# Patient Record
Sex: FEMALE | Race: OTHER | Employment: UNEMPLOYED | URBAN - METROPOLITAN AREA
[De-identification: names, ages, dates, MRNs, and addresses within clinical notes are randomized per-mention and may not be internally consistent; named-entity substitution may affect disease eponyms.]

---

## 2017-11-27 ENCOUNTER — OFFICE VISIT (OUTPATIENT)
Dept: FAMILY MEDICINE CLINIC | Age: 77
End: 2017-11-27

## 2017-11-27 VITALS
WEIGHT: 117.6 LBS | DIASTOLIC BLOOD PRESSURE: 64 MMHG | BODY MASS INDEX: 23.71 KG/M2 | HEART RATE: 70 BPM | RESPIRATION RATE: 12 BRPM | HEIGHT: 59 IN | TEMPERATURE: 98.4 F | OXYGEN SATURATION: 96 % | SYSTOLIC BLOOD PRESSURE: 98 MMHG

## 2017-11-27 DIAGNOSIS — R63.4 WEIGHT LOSS: ICD-10-CM

## 2017-11-27 DIAGNOSIS — I10 ESSENTIAL HYPERTENSION: Primary | ICD-10-CM

## 2017-11-27 DIAGNOSIS — R41.3 MEMORY PROBLEM: ICD-10-CM

## 2017-11-27 DIAGNOSIS — H91.93 BILATERAL HEARING LOSS, UNSPECIFIED HEARING LOSS TYPE: ICD-10-CM

## 2017-11-27 DIAGNOSIS — E78.5 HYPERLIPIDEMIA, UNSPECIFIED HYPERLIPIDEMIA TYPE: ICD-10-CM

## 2017-11-27 DIAGNOSIS — Z86.73 HISTORY OF STROKE: ICD-10-CM

## 2017-11-27 DIAGNOSIS — H54.7 VISION DECREASED: ICD-10-CM

## 2017-11-27 DIAGNOSIS — Z23 NEED FOR INFLUENZA VACCINATION: ICD-10-CM

## 2017-11-27 PROCEDURE — 1123F ACP DISCUSS/DSCN MKR DOCD: CPT | Performed by: FAMILY MEDICINE

## 2017-11-27 PROCEDURE — 99204 OFFICE O/P NEW MOD 45 MIN: CPT | Performed by: FAMILY MEDICINE

## 2017-11-27 PROCEDURE — G8484 FLU IMMUNIZE NO ADMIN: HCPCS | Performed by: FAMILY MEDICINE

## 2017-11-27 PROCEDURE — 4040F PNEUMOC VAC/ADMIN/RCVD: CPT | Performed by: FAMILY MEDICINE

## 2017-11-27 PROCEDURE — 1036F TOBACCO NON-USER: CPT | Performed by: FAMILY MEDICINE

## 2017-11-27 PROCEDURE — 1090F PRES/ABSN URINE INCON ASSESS: CPT | Performed by: FAMILY MEDICINE

## 2017-11-27 PROCEDURE — 90686 IIV4 VACC NO PRSV 0.5 ML IM: CPT | Performed by: FAMILY MEDICINE

## 2017-11-27 PROCEDURE — 90471 IMMUNIZATION ADMIN: CPT | Performed by: FAMILY MEDICINE

## 2017-11-27 PROCEDURE — G8420 CALC BMI NORM PARAMETERS: HCPCS | Performed by: FAMILY MEDICINE

## 2017-11-27 PROCEDURE — G8400 PT W/DXA NO RESULTS DOC: HCPCS | Performed by: FAMILY MEDICINE

## 2017-11-27 PROCEDURE — G8427 DOCREV CUR MEDS BY ELIG CLIN: HCPCS | Performed by: FAMILY MEDICINE

## 2017-11-27 RX ORDER — ATORVASTATIN CALCIUM 40 MG/1
TABLET, FILM COATED ORAL
Refills: 1 | COMMUNITY
Start: 2017-10-03 | End: 2017-11-27 | Stop reason: SDUPTHER

## 2017-11-27 RX ORDER — METOPROLOL SUCCINATE 50 MG/1
50 TABLET, EXTENDED RELEASE ORAL DAILY
Qty: 90 TABLET | Refills: 1 | Status: SHIPPED | OUTPATIENT
Start: 2017-11-27 | End: 2018-04-30 | Stop reason: SDUPTHER

## 2017-11-27 RX ORDER — ASPIRIN 325 MG
325 TABLET ORAL DAILY
COMMUNITY
End: 2017-11-27 | Stop reason: SDUPTHER

## 2017-11-27 RX ORDER — ATORVASTATIN CALCIUM 40 MG/1
40 TABLET, FILM COATED ORAL DAILY
Qty: 90 TABLET | Refills: 1 | Status: SHIPPED | OUTPATIENT
Start: 2017-11-27 | End: 2018-04-30 | Stop reason: SDUPTHER

## 2017-11-27 RX ORDER — METOPROLOL SUCCINATE 50 MG/1
TABLET, EXTENDED RELEASE ORAL
Refills: 1 | COMMUNITY
Start: 2017-10-03 | End: 2017-11-27 | Stop reason: SDUPTHER

## 2017-11-27 RX ORDER — ASPIRIN 325 MG
325 TABLET ORAL DAILY
Qty: 90 TABLET | Refills: 1 | Status: SHIPPED | OUTPATIENT
Start: 2017-11-27 | End: 2018-04-30 | Stop reason: SDUPTHER

## 2017-11-27 NOTE — PROGRESS NOTES
Cliff Muñoz   YOB: 1940    Date of Visit:  11/27/2017        Chief Complaint   Patient presents with    Established New Doctor     New patient visit    Weight Loss     Daughter is concerned with mothers weight loss in the past 6-8 months. Pts daughter states pt has loss a lot of weight     Hypertension    Hyperlipidemia    Other     s/p CVA    Memory Loss    Hearing Loss    Cataract     Patient is a 75-year-old Jayden only speaking female presenting to [facility] primary care. She is accompanied here today by her daughter, Rema Mohs. HPI:      Hypertension:  Condition is chronic. Home blood pressure monitoring has been within normal range. Patient denies chest pain, shortness of breath, headache, lightheadedness, peripheral edema and palpitations. No medication side effects noted from antihypertensive medication. Hyperlipidemia:  No new myalgias or GI upset on atorvastatin (Lipitor). Does not exercise regularly. Does try to adhere to a low cholesterol diet. H/O Stroke: She presents for follow-up of a stroke that occurred in 2013. She has no residual symptoms. She denies gait disturbance, balance disturbance, slurred speech, weakness/paralysis. Overall she feels the condition is resolved and stable. Stroke risk factors include hyperlipidemia and hypertension. She denies chest pain, palpitations, seizures and shortness of breath. She takes a full-dose aspirin daily. Weight loss:  Patient's daughter requesting discussion regarding unexplained/excessive weight loss. Onset was a few weeks ago. Patient nor her daughter is able to elaborate on approximately how much weight patient has lost, however, daughter feels that she is looking thinner. Patient feels her ideal weight is current weight. She denies history of eating disorder.  Patient denies abdominal pain, binge/ purge behaviors, constipation, depressive symptoms, diarrhea, intentional calorie content of this document, please contact the author as some errors in transcription may have occurred.

## 2017-11-27 NOTE — PROGRESS NOTES
Vaccine Information Sheet, \"Influenza - Inactivated\"  given to Biju Soto, or parent/legal guardian of  Biju Soto and verbalized understanding. Patient responses:    Have you ever had a reaction to a flu vaccine? No  Are you able to eat eggs without adverse effects? Yes  Do you have any current illness? No  Have you ever had Guillian Beaufort Syndrome? No    Flu vaccine given per order. Please see immunization tab.

## 2017-11-27 NOTE — PATIENT INSTRUCTIONS
in your health, and be sure to contact your doctor if:  · You do not get better as expected. · You continue to lose weight. Where can you learn more? Go to https://FREECULTRpejuanewCemmerce.Buzzvil. org and sign in to your My Healthy World account. Enter K290 in the EchoSign box to learn more about \"Abnormal Weight Loss: Care Instructions. \"     If you do not have an account, please click on the \"Sign Up Now\" link. Current as of: October 13, 2016  Content Version: 11.3  © 6182-1438 [employer]. Care instructions adapted under license by [facility] ([facility]). If you have questions about a medical condition or this instruction, always ask your healthcare professional. [address] any warranty or liability for your use of this information. Patient Education        Body Mass Index: Care Instructions  Your Care Instructions    Body mass index (BMI) can help you see if your weight is raising your risk for health problems. It uses a formula to compare how much you weigh with how tall you are. · A BMI lower than 18.5 is considered underweight. · A BMI between 18.5 and 24.9 is considered healthy. · A BMI between 25 and 29.9 is considered overweight. A BMI of 30 or higher is considered obese. If your BMI is in the normal range, it means that you have a lower risk for weight-related health problems. If your BMI is in the overweight or obese range, you may be at increased risk for weight-related health problems, such as high blood pressure, heart disease, stroke, arthritis or joint pain, and diabetes. If your BMI is in the underweight range, you may be at increased risk for health problems such as fatigue, lower protection (immunity) against illness, muscle loss, bone loss, hair loss, and hormone problems. BMI is just one measure of your risk for weight-related health problems.  You may be at higher risk for health problems if you are not active, you eat an unhealthy diet, or you drink sign in to your [employer] account. Enter S176 in the Needish box to learn more about \"Body Mass Index: Care Instructions. \"     If you do not have an account, please click on the \"Sign Up Now\" link. Current as of: January 23, 2017  Content Version: 11.3  © 6974-8065 [employer]. Care instructions adapted under license by [facility] [employer] [employer] [facility] ([facility]). If you have questions about a medical condition or this instruction, always ask your healthcare professional. [address] any warranty or liability for your use of this information. Patient Education        Helping A Person With Dementia: Care Instructions  Your Care Instructions  Dementia is a loss of mental skills that affects daily life. It is different from mild memory loss that occurs with aging. Dementia can cause problems with memory, thinking clearly, and planning. It is different for everyone. But it usually gets worse slowly. Some people who have dementia can function well for a long time. But at some point it may become hard for the person to care for himself or herself. It can be upsetting to learn that a loved one has this condition. You may be afraid and worried about what will happen. You may wonder how you will care for the person. There is no cure for dementia. But medicine may be able to slow memory loss and improve thinking for a while. Other medicines may help with sleep, depression, and behavior changes. Dementia is different for everyone. In some cases, people can function well for a long time. You can help your loved one by making his or her home life easier and safer. You also need to take care of yourself. Caregiving can be stressful. But support is available to help you and give you a break when you need it. The Alzheimer's Association offers good information and support. If you are caring for someone with dementia, you can help make life safer and more comfortable.  You can also help your loved one make decisions about future care. You may also want to bring up legal and financial issues. These are hard but important conversations to have. Follow-up care is a key part of your loved one's treatment and safety. Be sure to make and go to all appointments, and call your doctor if your loved one is having problems. It's also a good idea to know your loved one's test results and keep a list of the medicines he or she takes. How can you care for your loved one at home? Taking care of the person  · If the person takes medicine for dementia, help him or her take it exactly as prescribed. Call the doctor if you notice any problems with the medicine. · Make a list of the person's medicines. Review it with all of his or her doctors. · Help the person eat a balanced diet. Serve plenty of whole grains, fruits, and vegetables every day. If the person is not hungry at mealtimes, give snacks at midmorning and in the afternoon. Offer drinks such as Boost, Ensure, or Sustacal if the person is losing weight. · Encourage exercise. Walking and other activities may slow the decline of mental ability. Help the person stay active mentally with reading, crossword puzzles, or other hobbies. · Take steps to help if the person is sundowning. This is the restless behavior and trouble with sleeping that may occur in late afternoon and at night. Try not to let the person nap during the day. Offer a glass of warm milk or caffeine-free tea before bedtime. · Develop a routine. Your loved one will feel less frustrated or confused with a clear, simple plan of what to do every day. · Be patient. A task may take the person longer than it used to. · For as long as he or she is able, allow your loved one to make decisions about activities, food, clothing, and other choices. Let him or her be independent, even if tasks take more time or are not done perfectly. Tailor tasks to the person's abilities.  For example, if cooking is no longer safe, problems. It's also a good idea to know your test results and keep a list of the medicines you take. Where can you learn more? Go to https://chpejuaneweb.The African Store. org and sign in to your [employer] Autom[employer]et account. Enter [id number] [phone] in the [facility] box to learn more about \"Learning About the Symptoms of Dementia. \"     If you do not have an account, please click on the \"Sign Up Now\" link. Current as of: May 3, 2017  Content Version: 11.3  © 7168-2377 [employer]. Care instructions adapted under license by [facility] ([facility]). If you have questions about a medical condition or this instruction, always ask your healthcare professional. [address] any warranty or liability for your use of this information.

## 2017-11-29 ENCOUNTER — TELEPHONE (OUTPATIENT)
Dept: FAMILY MEDICINE CLINIC | Age: 77
End: 2017-11-29

## 2017-11-29 DIAGNOSIS — E78.5 HYPERLIPIDEMIA, UNSPECIFIED HYPERLIPIDEMIA TYPE: ICD-10-CM

## 2017-11-29 DIAGNOSIS — R63.4 WEIGHT LOSS: ICD-10-CM

## 2017-11-29 DIAGNOSIS — E55.9 VITAMIN D DEFICIENCY: Primary | ICD-10-CM

## 2017-11-29 LAB
A/G RATIO: 1.1 (ref 1.1–2.2)
ALBUMIN SERPL-MCNC: 3.7 G/DL (ref 3.4–5)
ALP BLD-CCNC: 82 U/L (ref 40–129)
ALT SERPL-CCNC: 9 U/L (ref 10–40)
ANION GAP SERPL CALCULATED.3IONS-SCNC: 16 MMOL/L (ref 3–16)
AST SERPL-CCNC: 21 U/L (ref 15–37)
BASOPHILS ABSOLUTE: 0.1 K/UL (ref 0–0.2)
BASOPHILS RELATIVE PERCENT: 1.2 %
BILIRUB SERPL-MCNC: 0.5 MG/DL (ref 0–1)
BUN BLDV-MCNC: 20 MG/DL (ref 7–20)
CALCIUM SERPL-MCNC: 8.6 MG/DL (ref 8.3–10.6)
CHLORIDE BLD-SCNC: 103 MMOL/L (ref 99–110)
CO2: 22 MMOL/L (ref 21–32)
CREAT SERPL-MCNC: 0.8 MG/DL (ref 0.6–1.2)
EOSINOPHILS ABSOLUTE: 0.3 K/UL (ref 0–0.6)
EOSINOPHILS RELATIVE PERCENT: 4.6 %
FOLATE: 12.82 NG/ML (ref 4.78–24.2)
GFR AFRICAN AMERICAN: >60
GFR NON-AFRICAN AMERICAN: >60
GLOBULIN: 3.3 G/DL
GLUCOSE BLD-MCNC: 81 MG/DL (ref 70–99)
HCT VFR BLD CALC: 35 % (ref 36–48)
HEMOGLOBIN: 11.5 G/DL (ref 12–16)
LYMPHOCYTES ABSOLUTE: 2.1 K/UL (ref 1–5.1)
LYMPHOCYTES RELATIVE PERCENT: 34.5 %
MCH RBC QN AUTO: 29.5 PG (ref 26–34)
MCHC RBC AUTO-ENTMCNC: 33 G/DL (ref 31–36)
MCV RBC AUTO: 89.5 FL (ref 80–100)
MONOCYTES ABSOLUTE: 0.7 K/UL (ref 0–1.3)
MONOCYTES RELATIVE PERCENT: 11.4 %
NEUTROPHILS ABSOLUTE: 3 K/UL (ref 1.7–7.7)
NEUTROPHILS RELATIVE PERCENT: 48.3 %
PDW BLD-RTO: 13.9 % (ref 12.4–15.4)
PLATELET # BLD: 257 K/UL (ref 135–450)
PMV BLD AUTO: 8.8 FL (ref 5–10.5)
POTASSIUM SERPL-SCNC: 4.8 MMOL/L (ref 3.5–5.1)
RBC # BLD: 3.91 M/UL (ref 4–5.2)
SODIUM BLD-SCNC: 141 MMOL/L (ref 136–145)
TOTAL PROTEIN: 7 G/DL (ref 6.4–8.2)
TSH REFLEX FT4: 3.66 UIU/ML (ref 0.27–4.2)
VITAMIN B-12: 313 PG/ML (ref 211–911)
WBC # BLD: 6.2 K/UL (ref 4–11)

## 2017-11-29 NOTE — TELEPHONE ENCOUNTER
Patient stopped in for labs, and her son wanted to see if she could have lipid panel done since she is taking cholesterol medication and the Vit D test, due to patient was taking vitamin D as well, please advise patient if yes or no, and if so please have this added to labs to be completed

## 2017-11-30 LAB
CHOLESTEROL, TOTAL: 141 MG/DL (ref 0–199)
HDLC SERPL-MCNC: 55 MG/DL (ref 40–60)
LDL CHOLESTEROL CALCULATED: 70 MG/DL
RPR: NORMAL
TRIGL SERPL-MCNC: 82 MG/DL (ref 0–150)
VITAMIN D 25-HYDROXY: 11.5 NG/ML
VLDLC SERPL CALC-MCNC: 16 MG/DL

## 2017-12-01 ENCOUNTER — TELEPHONE (OUTPATIENT)
Dept: FAMILY MEDICINE CLINIC | Age: 77
End: 2017-12-01

## 2017-12-01 NOTE — TELEPHONE ENCOUNTER
Daughter calling for recent lab results. Advised her Dr. Ama Sampson has not reviewed them yet. Will receive a call after.     Daughter Celina Baker #[phone]

## 2017-12-02 PROBLEM — Z86.73 HISTORY OF STROKE: Status: ACTIVE | Noted: 2017-12-02

## 2017-12-02 PROBLEM — Z86.69 HISTORY OF CATARACT: Status: ACTIVE | Noted: 2017-12-02

## 2017-12-02 PROBLEM — R41.3 MEMORY PROBLEM: Status: ACTIVE | Noted: 2017-12-02

## 2017-12-02 ASSESSMENT — ENCOUNTER SYMPTOMS
BLURRED VISION: 1
GASTROINTESTINAL NEGATIVE: 1
RESPIRATORY NEGATIVE: 1

## 2017-12-05 DIAGNOSIS — E55.9 VITAMIN D DEFICIENCY: Primary | ICD-10-CM

## 2017-12-05 RX ORDER — ERGOCALCIFEROL 1.25 MG/1
50000 CAPSULE ORAL WEEKLY
Qty: 12 CAPSULE | Refills: 0 | Status: SHIPPED | OUTPATIENT
Start: 2017-12-05 | End: 2018-03-20 | Stop reason: SDUPTHER

## 2017-12-12 ENCOUNTER — OFFICE VISIT (OUTPATIENT)
Dept: ENT CLINIC | Age: 77
End: 2017-12-12

## 2017-12-12 DIAGNOSIS — H90.3 SENSORY HEARING LOSS, BILATERAL: Primary | ICD-10-CM

## 2017-12-12 DIAGNOSIS — H91.13 PRESBYCUSIS OF BOTH EARS: ICD-10-CM

## 2017-12-12 PROCEDURE — 92557 COMPREHENSIVE HEARING TEST: CPT | Performed by: AUDIOLOGIST

## 2017-12-12 PROCEDURE — 92550 TYMPANOMETRY & REFLEX THRESH: CPT | Performed by: AUDIOLOGIST

## 2018-03-20 RX ORDER — ERGOCALCIFEROL 1.25 MG/1
50000 CAPSULE ORAL WEEKLY
Qty: 12 CAPSULE | Refills: 0 | Status: SHIPPED | OUTPATIENT
Start: 2018-03-20 | End: 2018-04-30 | Stop reason: SDUPTHER

## 2018-04-30 ENCOUNTER — OFFICE VISIT (OUTPATIENT)
Dept: FAMILY MEDICINE CLINIC | Age: 78
End: 2018-04-30

## 2018-04-30 VITALS
SYSTOLIC BLOOD PRESSURE: 102 MMHG | BODY MASS INDEX: 23.39 KG/M2 | DIASTOLIC BLOOD PRESSURE: 60 MMHG | WEIGHT: 116 LBS | OXYGEN SATURATION: 98 % | TEMPERATURE: 97.2 F | HEART RATE: 102 BPM | RESPIRATION RATE: 16 BRPM | HEIGHT: 59 IN

## 2018-04-30 DIAGNOSIS — Z86.73 HISTORY OF STROKE: ICD-10-CM

## 2018-04-30 DIAGNOSIS — I10 ESSENTIAL HYPERTENSION: Primary | ICD-10-CM

## 2018-04-30 DIAGNOSIS — Z23 NEED FOR PNEUMOCOCCAL VACCINATION: ICD-10-CM

## 2018-04-30 DIAGNOSIS — Z23 NEED FOR SHINGLES VACCINE: ICD-10-CM

## 2018-04-30 DIAGNOSIS — M85.80 AGE-RELATED BONE LOSS: ICD-10-CM

## 2018-04-30 DIAGNOSIS — E55.9 VITAMIN D DEFICIENCY: ICD-10-CM

## 2018-04-30 DIAGNOSIS — E78.5 HYPERLIPIDEMIA, UNSPECIFIED HYPERLIPIDEMIA TYPE: ICD-10-CM

## 2018-04-30 DIAGNOSIS — D64.9 ANEMIA, UNSPECIFIED TYPE: ICD-10-CM

## 2018-04-30 LAB
CHOLESTEROL, TOTAL: 123 MG/DL (ref 0–199)
HDLC SERPL-MCNC: 71 MG/DL (ref 40–60)
LDL CHOLESTEROL CALCULATED: 41 MG/DL
TRIGL SERPL-MCNC: 57 MG/DL (ref 0–150)
VITAMIN D 25-HYDROXY: 51.5 NG/ML
VLDLC SERPL CALC-MCNC: 11 MG/DL

## 2018-04-30 PROCEDURE — G8427 DOCREV CUR MEDS BY ELIG CLIN: HCPCS | Performed by: FAMILY MEDICINE

## 2018-04-30 PROCEDURE — 1090F PRES/ABSN URINE INCON ASSESS: CPT | Performed by: FAMILY MEDICINE

## 2018-04-30 PROCEDURE — G8400 PT W/DXA NO RESULTS DOC: HCPCS | Performed by: FAMILY MEDICINE

## 2018-04-30 PROCEDURE — G8420 CALC BMI NORM PARAMETERS: HCPCS | Performed by: FAMILY MEDICINE

## 2018-04-30 PROCEDURE — 90471 IMMUNIZATION ADMIN: CPT | Performed by: FAMILY MEDICINE

## 2018-04-30 PROCEDURE — 1036F TOBACCO NON-USER: CPT | Performed by: FAMILY MEDICINE

## 2018-04-30 PROCEDURE — 1123F ACP DISCUSS/DSCN MKR DOCD: CPT | Performed by: FAMILY MEDICINE

## 2018-04-30 PROCEDURE — 90670 PCV13 VACCINE IM: CPT | Performed by: FAMILY MEDICINE

## 2018-04-30 PROCEDURE — 99214 OFFICE O/P EST MOD 30 MIN: CPT | Performed by: FAMILY MEDICINE

## 2018-04-30 PROCEDURE — 4040F PNEUMOC VAC/ADMIN/RCVD: CPT | Performed by: FAMILY MEDICINE

## 2018-04-30 RX ORDER — METOPROLOL SUCCINATE 50 MG/1
50 TABLET, EXTENDED RELEASE ORAL DAILY
Qty: 90 TABLET | Refills: 2 | Status: SHIPPED | OUTPATIENT
Start: 2018-04-30 | End: 2019-03-14 | Stop reason: SDUPTHER

## 2018-04-30 RX ORDER — ASPIRIN 325 MG
325 TABLET ORAL DAILY
Qty: 90 TABLET | Refills: 2 | Status: SHIPPED | OUTPATIENT
Start: 2018-04-30 | End: 2019-03-14 | Stop reason: SDUPTHER

## 2018-04-30 RX ORDER — ATORVASTATIN CALCIUM 40 MG/1
40 TABLET, FILM COATED ORAL DAILY
Qty: 90 TABLET | Refills: 2 | Status: SHIPPED | OUTPATIENT
Start: 2018-04-30 | End: 2019-03-14 | Stop reason: SDUPTHER

## 2018-04-30 RX ORDER — ERGOCALCIFEROL 1.25 MG/1
50000 CAPSULE ORAL WEEKLY
Qty: 12 CAPSULE | Refills: 0 | Status: SHIPPED | OUTPATIENT
Start: 2018-04-30 | End: 2019-03-14 | Stop reason: SDUPTHER

## 2018-05-06 ASSESSMENT — ENCOUNTER SYMPTOMS
GASTROINTESTINAL NEGATIVE: 1
RESPIRATORY NEGATIVE: 1

## 2019-03-14 ENCOUNTER — TELEPHONE (OUTPATIENT)
Dept: FAMILY MEDICINE CLINIC | Age: 79
End: 2019-03-14

## 2019-03-14 ENCOUNTER — OFFICE VISIT (OUTPATIENT)
Dept: FAMILY MEDICINE CLINIC | Age: 79
End: 2019-03-14
Payer: COMMERCIAL

## 2019-03-14 VITALS
BODY MASS INDEX: 21.89 KG/M2 | WEIGHT: 108.6 LBS | HEART RATE: 63 BPM | OXYGEN SATURATION: 96 % | SYSTOLIC BLOOD PRESSURE: 118 MMHG | DIASTOLIC BLOOD PRESSURE: 76 MMHG | HEIGHT: 59 IN

## 2019-03-14 DIAGNOSIS — R63.4 WEIGHT LOSS: ICD-10-CM

## 2019-03-14 DIAGNOSIS — H26.9 CATARACT OF RIGHT EYE, UNSPECIFIED CATARACT TYPE: ICD-10-CM

## 2019-03-14 DIAGNOSIS — Z90.12 HISTORY OF MASTECTOMY, LEFT: ICD-10-CM

## 2019-03-14 DIAGNOSIS — I10 ESSENTIAL HYPERTENSION: Primary | ICD-10-CM

## 2019-03-14 DIAGNOSIS — H90.5 SENSORY HEARING LOSS: ICD-10-CM

## 2019-03-14 DIAGNOSIS — I10 ESSENTIAL HYPERTENSION: ICD-10-CM

## 2019-03-14 DIAGNOSIS — E78.5 HYPERLIPIDEMIA, UNSPECIFIED HYPERLIPIDEMIA TYPE: ICD-10-CM

## 2019-03-14 DIAGNOSIS — E55.9 VITAMIN D DEFICIENCY: ICD-10-CM

## 2019-03-14 DIAGNOSIS — R41.3 MEMORY PROBLEM: ICD-10-CM

## 2019-03-14 DIAGNOSIS — Z86.73 HISTORY OF STROKE: ICD-10-CM

## 2019-03-14 DIAGNOSIS — Z91.81 AT HIGH RISK FOR FALLS: ICD-10-CM

## 2019-03-14 LAB
BASOPHILS ABSOLUTE: 0.1 K/UL (ref 0–0.2)
BASOPHILS RELATIVE PERCENT: 0.8 %
EOSINOPHILS ABSOLUTE: 0.1 K/UL (ref 0–0.6)
EOSINOPHILS RELATIVE PERCENT: 1.9 %
HCT VFR BLD CALC: 36.9 % (ref 36–48)
HEMOGLOBIN: 12 G/DL (ref 12–16)
LYMPHOCYTES ABSOLUTE: 2.4 K/UL (ref 1–5.1)
LYMPHOCYTES RELATIVE PERCENT: 30.3 %
MCH RBC QN AUTO: 29.6 PG (ref 26–34)
MCHC RBC AUTO-ENTMCNC: 32.5 G/DL (ref 31–36)
MCV RBC AUTO: 91 FL (ref 80–100)
MONOCYTES ABSOLUTE: 0.7 K/UL (ref 0–1.3)
MONOCYTES RELATIVE PERCENT: 8.7 %
NEUTROPHILS ABSOLUTE: 4.6 K/UL (ref 1.7–7.7)
NEUTROPHILS RELATIVE PERCENT: 58.3 %
PDW BLD-RTO: 14.6 % (ref 12.4–15.4)
PLATELET # BLD: 257 K/UL (ref 135–450)
PMV BLD AUTO: 9.2 FL (ref 5–10.5)
RBC # BLD: 4.05 M/UL (ref 4–5.2)
VITAMIN B-12: 453 PG/ML (ref 211–911)
VITAMIN D 25-HYDROXY: 25.8 NG/ML
WBC # BLD: 7.8 K/UL (ref 4–11)

## 2019-03-14 PROCEDURE — 99204 OFFICE O/P NEW MOD 45 MIN: CPT | Performed by: NURSE PRACTITIONER

## 2019-03-14 RX ORDER — ERGOCALCIFEROL 1.25 MG/1
50000 CAPSULE ORAL WEEKLY
Qty: 12 CAPSULE | Refills: 0 | Status: SHIPPED | OUTPATIENT
Start: 2019-03-14 | End: 2019-05-16 | Stop reason: SDUPTHER

## 2019-03-14 RX ORDER — METOPROLOL SUCCINATE 50 MG/1
50 TABLET, EXTENDED RELEASE ORAL DAILY
Qty: 90 TABLET | Refills: 2 | Status: SHIPPED | OUTPATIENT
Start: 2019-03-14 | End: 2019-11-18 | Stop reason: SDUPTHER

## 2019-03-14 RX ORDER — ASPIRIN 325 MG
325 TABLET ORAL DAILY
Qty: 90 TABLET | Refills: 2 | Status: SHIPPED | OUTPATIENT
Start: 2019-03-14 | End: 2019-11-18 | Stop reason: SDUPTHER

## 2019-03-14 RX ORDER — ATORVASTATIN CALCIUM 40 MG/1
40 TABLET, FILM COATED ORAL DAILY
Qty: 90 TABLET | Refills: 2 | Status: SHIPPED | OUTPATIENT
Start: 2019-03-14 | End: 2019-11-18 | Stop reason: SDUPTHER

## 2019-03-14 ASSESSMENT — ENCOUNTER SYMPTOMS
ABDOMINAL DISTENTION: 0
CHEST TIGHTNESS: 0
ABDOMINAL PAIN: 0
WHEEZING: 0
SHORTNESS OF BREATH: 0

## 2019-03-14 ASSESSMENT — PATIENT HEALTH QUESTIONNAIRE - PHQ9
2. FEELING DOWN, DEPRESSED OR HOPELESS: 0
SUM OF ALL RESPONSES TO PHQ QUESTIONS 1-9: 0
SUM OF ALL RESPONSES TO PHQ QUESTIONS 1-9: 0
1. LITTLE INTEREST OR PLEASURE IN DOING THINGS: 0
SUM OF ALL RESPONSES TO PHQ9 QUESTIONS 1 & 2: 0

## 2019-03-15 LAB
A/G RATIO: 1.3 (ref 1.1–2.2)
ALBUMIN SERPL-MCNC: 4.2 G/DL (ref 3.4–5)
ALP BLD-CCNC: 82 U/L (ref 40–129)
ALT SERPL-CCNC: 12 U/L (ref 10–40)
ANION GAP SERPL CALCULATED.3IONS-SCNC: 13 MMOL/L (ref 3–16)
AST SERPL-CCNC: 21 U/L (ref 15–37)
BILIRUB SERPL-MCNC: 0.4 MG/DL (ref 0–1)
BUN BLDV-MCNC: 20 MG/DL (ref 7–20)
CALCIUM SERPL-MCNC: 9.4 MG/DL (ref 8.3–10.6)
CHLORIDE BLD-SCNC: 100 MMOL/L (ref 99–110)
CHOLESTEROL, TOTAL: 125 MG/DL (ref 0–199)
CO2: 24 MMOL/L (ref 21–32)
CREAT SERPL-MCNC: 1 MG/DL (ref 0.6–1.2)
GFR AFRICAN AMERICAN: >60
GFR NON-AFRICAN AMERICAN: 53
GLOBULIN: 3.3 G/DL
GLUCOSE BLD-MCNC: 96 MG/DL (ref 70–99)
HDLC SERPL-MCNC: 60 MG/DL (ref 40–60)
LDL CHOLESTEROL CALCULATED: 53 MG/DL
POTASSIUM SERPL-SCNC: 4.6 MMOL/L (ref 3.5–5.1)
SODIUM BLD-SCNC: 137 MMOL/L (ref 136–145)
TOTAL PROTEIN: 7.5 G/DL (ref 6.4–8.2)
TRIGL SERPL-MCNC: 59 MG/DL (ref 0–150)
TSH SERPL DL<=0.05 MIU/L-ACNC: 3.09 UIU/ML (ref 0.27–4.2)
VLDLC SERPL CALC-MCNC: 12 MG/DL

## 2019-05-13 ENCOUNTER — OFFICE VISIT (OUTPATIENT)
Dept: NEUROLOGY | Age: 79
End: 2019-05-13
Payer: COMMERCIAL

## 2019-05-13 VITALS
HEART RATE: 70 BPM | HEIGHT: 59 IN | WEIGHT: 112 LBS | BODY MASS INDEX: 22.58 KG/M2 | SYSTOLIC BLOOD PRESSURE: 143 MMHG | DIASTOLIC BLOOD PRESSURE: 70 MMHG

## 2019-05-13 DIAGNOSIS — Z86.73 HISTORY OF STROKE: ICD-10-CM

## 2019-05-13 DIAGNOSIS — G31.84 MILD COGNITIVE IMPAIRMENT, SO STATED: Primary | ICD-10-CM

## 2019-05-13 PROCEDURE — 99244 OFF/OP CNSLTJ NEW/EST MOD 40: CPT | Performed by: PSYCHIATRY & NEUROLOGY

## 2019-05-13 PROCEDURE — G8420 CALC BMI NORM PARAMETERS: HCPCS | Performed by: PSYCHIATRY & NEUROLOGY

## 2019-05-13 PROCEDURE — G8427 DOCREV CUR MEDS BY ELIG CLIN: HCPCS | Performed by: PSYCHIATRY & NEUROLOGY

## 2019-05-13 PROCEDURE — 1090F PRES/ABSN URINE INCON ASSESS: CPT | Performed by: PSYCHIATRY & NEUROLOGY

## 2019-05-13 NOTE — PROGRESS NOTES
NEUROLOGY CONSULTATION     Chief Complaint   Patient presents with    Cerebrovascular Accident     Patient is here today to establish care. Patient has a hx of a stroke. HISTORY OF PRESENT ILLNESS :    Evy Barros is a [de-identified] y.o. female who is referred by Dr. Teri Pisano   History was obtained from patient  History was also obtained from the patient's daughter who accompanied her. There was a  since patient does not speak much [place]. Patient had a small left thalamic infarction in 2013 without much residual deficits. She then had a questionable spell in 2007. She was [place] at that time and passed out. Apparently she was taken to a local hospital and had extensive testing and she was told that she had a minor stroke but no further details are available at this time. Patient's family had noticed memory impairment which started in 2018. Onset was gradual.  Symptoms are persistent. No clear aggravating or relieving factors. Patient does not sleep well at night. She denies any focal weakness numbness vertigo or diplopia at this time.     REVIEW OF SYSTEMS    Constitutional:  []   Chills   []  Fatigue   []  Fevers   []  Malaise   [x]  Weight loss     [] Denies all of the above    Eyes:  []  Double vision   []  Blurry vision     [x] Denies all of the above    Ears, nose, mouth, throat, and face:   [x] Hearing loss    []   Hoarseness      []  Snoring    []  Tinnitus       [] Denies all of the above     Respiratory:   []  Cough    []  Shortness of breath         [x] Denies all of the above     Cardiovascular:   []  Chest pain    []  Exertional chest pressure/discomfort           [] Palpitations    []  Syncope     [x] Denies all of the above    Gastrointestinal:   []  Abdominal pain   []  Constipation    []  Diarrhea    []   Dysphagia                      [x] Denies all of the above    Genitourinary:      [] Frequency   []  Hematuria     []  Urinary incontinence           [x] Denies all of the above     Hematologic/lymphatic:  []  Bleeding    []  Easy bruising   []  Anemia  [x] Denies all of the above     Musculoskeletal:   [] Back pain       []  Myalgias    []  Neck pain           [x] Denies all of the above    Neurological: As noted in HPI    Behavioral/Psych:   [] Anxiety    []  Depression     []  Mood swings     [x] Denies all of the above     Endocrine:   []  Temperature intolerance     [] Fatigue      [x] Denies all of the above     Allergic/Immunologic:   [] Hay fever    [x] Denies all of the above     Past Medical History:   Diagnosis Date    [facility] [address] 1997    Hyperlipidemia     Hypertension     [facility]     Family History   Problem Relation Age of Onset    Diabetes Mother      Social History     Socioeconomic History    Marital status:       Spouse name: None    Number of children: 5    Years of education: None    Highest education level: None   Occupational History     Comment: Retired   Social Needs    Financial resource strain: None    Food insecurity:     Worry: None     Inability: None    Transportation needs:     Medical: None     Non-medical: None   Tobacco Use    Smoking status: Never Smoker    Smokeless tobacco: Never Used   Substance and Sexual Activity    Alcohol use: No    Drug use: No    Sexual activity: Not Currently     Partners: Male   Lifestyle    Physical activity:     Days per week: None     Minutes per session: None    Stress: None   Relationships    Social connections:     Talks on phone: None     Gets together: None     Attends Advent service: None     Active member of club or organization: None     Attends meetings of clubs or organizations: None     Relationship status: None    Intimate partner violence:     Fear of current or ex partner: None     Emotionally abused: None     Physically abused: None     Forced 03/14/2019    CO2 24 03/14/2019    BUN 20 03/14/2019    LABALBU 4.2 03/14/2019    CREATININE 1.0 03/14/2019    CALCIUM 9.4 03/14/2019    GFRAA >60 03/14/2019    LABGLOM 53 03/14/2019    GLUCOSE 96 03/14/2019     RADIOLOGY REVIEW:  I have reviewed radiology report(s) of:  MRI brain from 2013 as well as 2016    IMPRESSION :  Mild cognitive impairment  MRI brain in 2013 had shown a small left thalamic infarction. Hypertension  History of stroke   No evidence for full blown dementia at this time  TSH and B12 levels were normal    RECOMMENDATIONS :  Discussed with patient and her family at length  No date for any dementia medication at this time  Continue to control hypertension  Continue antiplatelet therapy with aspirin  I will get an MRI brain because of the recent spell in [facility] where she was told that she had a small stroke  I will see her back in 6 months for follow-up  Family will call me sooner if her symptoms get worse  Thank you for this consultation        Please note a portion of this chart was generated using dragon dictation software. Although every effort was made to ensure the accuracy of this automated transcription, some errors in transcription may have occurred.

## 2019-05-13 NOTE — PATIENT INSTRUCTIONS
Please call with any questions or concerns:   JEAN CLAUDE [facility]  @ [phone]. LAB RESULTS:  Please obtain any labs or diagnostic tests as discussed today. You should call the office to check the results. Please allow  3 to 7 days for us to get these results. MEDICATION LIST:  Please bring an accurate list of your medications to every visit. APPOINTMENT CONFIRMATION:  We will call you the day before your scheduled appointment to confirm. If we are unable to reach you, you MUST call back by the end of the day to confirm the appointment or we may be forced to cancel.

## 2019-05-13 NOTE — LETTER
[facility]  [address] [address]  Phone: [phone]  Fax: [phone]    Ramiro Raw        May 13, 2019       Patient: Sal Engel   MR Number: [id number]   YOB: 1940   Date of Visit: 5/13/2019       Dear Dr. Shaun Linares: Thank you for the request for consultation for Sal Engel to me for the evaluation of memory impairment Below are the relevant portions of my assessment and plan of care. NEUROLOGY CONSULTATION     Chief Complaint   Patient presents with    Cerebrovascular Accident     Patient is here today to establish care. Patient has a hx of a stroke. HISTORY OF PRESENT ILLNESS :    Sal Engel is a [de-identified] y.o. female who is referred by Dr. Shaun Linares   History was obtained from patient  History was also obtained from the patient's daughter who accompanied her. There was a  since patient does not speak much [place]. Patient had a small left thalamic infarction in 2013 without much residual deficits. She then had a questionable spell in 2007. She was visiting M.D. at that time and passed out. Apparently she was taken to a local hospital and had extensive testing and she was told that she had a minor stroke but no further details are available at this time. Patient's family had noticed memory impairment which started in 2018. Onset was gradual.  Symptoms are persistent. No clear aggravating or relieving factors. Patient does not sleep well at night. She denies any focal weakness numbness vertigo or diplopia at this time.     REVIEW OF SYSTEMS    Constitutional:  []   Chills   []  Fatigue   []  Fevers   []  Malaise   [x]  Weight loss     [] Denies all of the above    Eyes:  []  Double vision   []  Blurry vision     [x] Denies all of the above    Ears, nose, mouth, throat, and face: [x] Hearing loss    []   Hoarseness      []  Snoring    []  Tinnitus       [] Denies all of the above     Respiratory:   []  Cough    []  Shortness of breath         [x] Denies all of the above     Cardiovascular:   []  Chest pain    []  Exertional chest pressure/discomfort           [] Palpitations    []  Syncope     [x] Denies all of the above    Gastrointestinal:   []  Abdominal pain   []  Constipation    []  Diarrhea    []   Dysphagia                      [x] Denies all of the above    Genitourinary:      []  Frequency   []  Hematuria     []  Urinary incontinence           [x] Denies all of the above     Hematologic/lymphatic:  []  Bleeding    []  Easy bruising   []  Anemia  [x] Denies all of the above     Musculoskeletal:   [] Back pain       []  Myalgias    []  Neck pain           [x] Denies all of the above    Neurological: As noted in HPI    Behavioral/Psych:   [] Anxiety    []  Depression     []  Mood swings     [x] Denies all of the above     Endocrine:   []  Temperature intolerance     [] Fatigue      [x] Denies all of the above     Allergic/Immunologic:   [] Hay fever    [x] Denies all of the above     Past Medical History:   Diagnosis Date    [facility]    Hyperlipidemia     Hypertension     [facility] 2013     Family History   Problem Relation Age of Onset    Diabetes Mother      Social History     Socioeconomic History    Marital status:       Spouse name: None    Number of children: 5    Years of education: None    Highest education level: None   Occupational History     Comment: Retired   Social Needs    Financial resource strain: None    Food insecurity:     Worry: None     Inability: None    Transportation needs:     Medical: None     Non-medical: None   Tobacco Use    Smoking status: Never Smoker    Smokeless tobacco: Never Used   Substance and Sexual Activity    Alcohol use: No    Drug use: No    Sexual activity: Not Currently

## 2019-05-16 DIAGNOSIS — E55.9 VITAMIN D DEFICIENCY: ICD-10-CM

## 2019-05-16 RX ORDER — ERGOCALCIFEROL 1.25 MG/1
50000 CAPSULE ORAL WEEKLY
Qty: 12 CAPSULE | Refills: 0 | Status: SHIPPED | OUTPATIENT
Start: 2019-05-16 | End: 2019-08-09 | Stop reason: SDUPTHER

## 2019-05-22 ENCOUNTER — HOSPITAL ENCOUNTER (OUTPATIENT)
Dept: MRI IMAGING | Age: 79
Discharge: HOME | End: 2019-05-22
Payer: COMMERCIAL

## 2019-05-22 DIAGNOSIS — G31.84 MILD COGNITIVE IMPAIRMENT, SO STATED: ICD-10-CM

## 2019-05-22 DIAGNOSIS — Z86.73 HISTORY OF STROKE: ICD-10-CM

## 2019-05-22 PROCEDURE — 70551 MRI BRAIN STEM W/O DYE: CPT

## 2019-06-13 ENCOUNTER — OFFICE VISIT (OUTPATIENT)
Dept: FAMILY MEDICINE CLINIC | Age: 79
End: 2019-06-13
Payer: COMMERCIAL

## 2019-06-13 VITALS
DIASTOLIC BLOOD PRESSURE: 70 MMHG | OXYGEN SATURATION: 98 % | RESPIRATION RATE: 12 BRPM | HEIGHT: 59 IN | WEIGHT: 110.4 LBS | SYSTOLIC BLOOD PRESSURE: 110 MMHG | HEART RATE: 63 BPM | BODY MASS INDEX: 22.26 KG/M2

## 2019-06-13 DIAGNOSIS — M25.561 RIGHT KNEE PAIN, UNSPECIFIED CHRONICITY: Primary | ICD-10-CM

## 2019-06-13 PROCEDURE — G8400 PT W/DXA NO RESULTS DOC: HCPCS | Performed by: NURSE PRACTITIONER

## 2019-06-13 PROCEDURE — 1090F PRES/ABSN URINE INCON ASSESS: CPT | Performed by: NURSE PRACTITIONER

## 2019-06-13 PROCEDURE — G8427 DOCREV CUR MEDS BY ELIG CLIN: HCPCS | Performed by: NURSE PRACTITIONER

## 2019-06-13 PROCEDURE — 1036F TOBACCO NON-USER: CPT | Performed by: NURSE PRACTITIONER

## 2019-06-13 PROCEDURE — G8420 CALC BMI NORM PARAMETERS: HCPCS | Performed by: NURSE PRACTITIONER

## 2019-06-13 PROCEDURE — 1123F ACP DISCUSS/DSCN MKR DOCD: CPT | Performed by: NURSE PRACTITIONER

## 2019-06-13 PROCEDURE — 4040F PNEUMOC VAC/ADMIN/RCVD: CPT | Performed by: NURSE PRACTITIONER

## 2019-06-13 PROCEDURE — 99213 OFFICE O/P EST LOW 20 MIN: CPT | Performed by: NURSE PRACTITIONER

## 2019-06-13 NOTE — PROGRESS NOTES
[facility] PHYSICIAN PRACTICES  [facility] PRIMARY CARE  [address] [address] [facility] [address]: [phone]         2019     Steven Liu (:  1940) is a [de-identified] y.o. female, here for evaluation of the following medical concerns:    Chief Complaint   Patient presents with    Hypertension    Other     [employer] phone  - # [phone]    Knee Pain     right knee is more tender than normal - w/ positional changes x 4-6 weeks        HPI   Right knee  1 month ago  Injection 15 years ago in knee  Walking feels ok  Getting up makes pain worse  Doesn't feel like it is going to give out  Sometimes she falls  Has not iced  Does elevate  Has not taken any medication  See by Ortho in [address]    Mild cognitive impairment  Visit with neurology went well  Short term memory loss- no medications recommended  MRI ordered- normal    Review of Systems   Constitutional: Negative for activity change and appetite change. Musculoskeletal: Positive for gait problem and joint swelling. Neurological: Negative for dizziness, weakness and headaches. Prior to Visit Medications    Medication Sig Taking? Authorizing Provider   vitamin D (ERGOCALCIFEROL) 92695 units CAPS capsule Take 1 capsule by mouth once a week Yes Sendy Fore, APRN - CNP   metoprolol succinate (TOPROL XL) 50 MG extended release tablet Take 1 tablet by mouth daily for blood pressure Yes Sendy Fore, APRN - CNP   atorvastatin (LIPITOR) 40 MG tablet Take 1 tablet by mouth daily for cholesterol Yes Concepcionin Fore, APRN - CNP   aspirin 325 MG tablet Take 1 tablet by mouth daily for heart protection Yes Sendy Huntley, APRN - CNP   Misc. Devices MISC Please provide two mastectomy bras of patient's choice. Dx: s/p left breast mastectomy. VERITO: lifelong.  Yes Sendy Huntley, APRN - CNP        Social History     Tobacco Use    Smoking status: Never Smoker    Smokeless tobacco: Never Used   Substance Use Topics    Alcohol use: No        Vitals:    06/13/19 1037   BP: 110/70   Site: Left Upper Arm   Position: Sitting   Cuff Size: Medium Adult   Pulse: 63   Resp: 12   SpO2: 98%   Weight: 110 lb 6.4 oz (50.1 kg)   Height: 4' 10.5\" (1.486 m)     Estimated body mass index is 22.68 kg/m² as calculated from the following:    Height as of this encounter: 4' 10.5\" (1.486 m). Weight as of this encounter: 110 lb 6.4 oz (50.1 kg). Physical Exam   Constitutional: She appears well-developed and well-nourished. HENT:   Head: Normocephalic. Cardiovascular: Normal rate, regular rhythm, normal heart sounds and normal pulses. Pulmonary/Chest: Effort normal and breath sounds normal.   Musculoskeletal:        Right knee: She exhibits swelling. She exhibits normal range of motion, no ecchymosis and no erythema. Tenderness found. Neurological: She is alert. Psychiatric: She has a normal mood and affect. ASSESSMENT/PLAN:  1. Right knee pain, unspecified chronicity  Stable; Will x-ray since patient has a hx of falling. Begin ice and elevation. If x-ray negative will consider Ortho vs. PT referral.    - XR KNEE RIGHT (3 VIEWS); Future      Follow Up:     Return if symptoms worsen or fail to improve.

## 2019-08-09 DIAGNOSIS — E55.9 VITAMIN D DEFICIENCY: ICD-10-CM

## 2019-08-09 RX ORDER — ERGOCALCIFEROL 1.25 MG/1
50000 CAPSULE ORAL WEEKLY
Qty: 12 CAPSULE | Refills: 0 | Status: SHIPPED | OUTPATIENT
Start: 2019-08-09 | End: 2019-12-06 | Stop reason: SDUPTHER

## 2019-11-15 DIAGNOSIS — E78.5 HYPERLIPIDEMIA, UNSPECIFIED HYPERLIPIDEMIA TYPE: ICD-10-CM

## 2019-11-15 DIAGNOSIS — Z86.73 HISTORY OF STROKE: ICD-10-CM

## 2019-11-15 DIAGNOSIS — I10 ESSENTIAL HYPERTENSION: ICD-10-CM

## 2019-11-18 RX ORDER — METOPROLOL SUCCINATE 50 MG/1
50 TABLET, EXTENDED RELEASE ORAL DAILY
Qty: 90 TABLET | Refills: 0 | Status: SHIPPED | OUTPATIENT
Start: 2019-11-18 | End: 2020-03-03 | Stop reason: SDUPTHER

## 2019-11-18 RX ORDER — ATORVASTATIN CALCIUM 40 MG/1
40 TABLET, FILM COATED ORAL DAILY
Qty: 90 TABLET | Refills: 0 | Status: SHIPPED | OUTPATIENT
Start: 2019-11-18 | End: 2020-02-05

## 2019-11-18 RX ORDER — ASPIRIN 325 MG
325 TABLET ORAL DAILY
Qty: 90 TABLET | Refills: 0 | Status: SHIPPED | OUTPATIENT
Start: 2019-11-18 | End: 2020-03-03 | Stop reason: SDUPTHER

## 2019-12-06 DIAGNOSIS — E55.9 VITAMIN D DEFICIENCY: ICD-10-CM

## 2019-12-06 RX ORDER — ERGOCALCIFEROL 1.25 MG/1
50000 CAPSULE ORAL WEEKLY
Qty: 12 CAPSULE | Refills: 0 | Status: SHIPPED | OUTPATIENT
Start: 2019-12-06 | End: 2020-02-06 | Stop reason: SDUPTHER

## 2020-02-06 RX ORDER — ERGOCALCIFEROL 1.25 MG/1
50000 CAPSULE ORAL WEEKLY
Qty: 12 CAPSULE | Refills: 0 | Status: SHIPPED | OUTPATIENT
Start: 2020-02-06 | End: 2020-05-08

## 2020-02-06 RX ORDER — ATORVASTATIN CALCIUM 40 MG/1
40 TABLET, FILM COATED ORAL DAILY
Qty: 90 TABLET | Refills: 0 | Status: SHIPPED | OUTPATIENT
Start: 2020-02-06 | End: 2020-05-08

## 2020-02-06 NOTE — TELEPHONE ENCOUNTER
[phone] (home)    Phone went straight to [employer]. Left message for Jordana to call back. We need to schedule Denise for a follow up appointment.

## 2020-03-02 RX ORDER — ASPIRIN 325 MG/1
TABLET, FILM COATED ORAL
Qty: 90 TABLET | Refills: 0 | OUTPATIENT
Start: 2020-03-02

## 2020-03-02 RX ORDER — METOPROLOL SUCCINATE 50 MG/1
50 TABLET, EXTENDED RELEASE ORAL DAILY
Qty: 90 TABLET | Refills: 0 | OUTPATIENT
Start: 2020-03-02

## 2020-03-02 NOTE — TELEPHONE ENCOUNTER
Last OV 6/13/2019 (acute); 3/14/2019 (New Patient)  Return in about 3 months (around 6/14/2019) for Blood Pressure.   Next OV Visit date not found

## 2020-03-03 RX ORDER — ASPIRIN 325 MG
325 TABLET ORAL DAILY
Qty: 30 TABLET | Refills: 0 | Status: SHIPPED | OUTPATIENT
Start: 2020-03-03 | End: 2020-03-31

## 2020-03-03 RX ORDER — METOPROLOL SUCCINATE 50 MG/1
50 TABLET, EXTENDED RELEASE ORAL DAILY
Qty: 30 TABLET | Refills: 0 | Status: SHIPPED | OUTPATIENT
Start: 2020-03-03 | End: 2020-03-31

## 2020-03-05 ENCOUNTER — OFFICE VISIT (OUTPATIENT)
Dept: FAMILY MEDICINE CLINIC | Age: 80
End: 2020-03-05
Payer: COMMERCIAL

## 2020-03-05 VITALS
WEIGHT: 111.2 LBS | OXYGEN SATURATION: 96 % | SYSTOLIC BLOOD PRESSURE: 118 MMHG | BODY MASS INDEX: 22.85 KG/M2 | DIASTOLIC BLOOD PRESSURE: 72 MMHG | HEART RATE: 67 BPM

## 2020-03-05 DIAGNOSIS — E78.5 HYPERLIPIDEMIA, UNSPECIFIED HYPERLIPIDEMIA TYPE: ICD-10-CM

## 2020-03-05 DIAGNOSIS — M25.562 CHRONIC PAIN OF BOTH KNEES: ICD-10-CM

## 2020-03-05 DIAGNOSIS — M25.561 CHRONIC PAIN OF BOTH KNEES: ICD-10-CM

## 2020-03-05 DIAGNOSIS — E55.9 VITAMIN D DEFICIENCY: ICD-10-CM

## 2020-03-05 DIAGNOSIS — G89.29 CHRONIC PAIN OF BOTH KNEES: ICD-10-CM

## 2020-03-05 DIAGNOSIS — I10 ESSENTIAL HYPERTENSION: ICD-10-CM

## 2020-03-05 LAB
A/G RATIO: 1.3 (ref 1.1–2.2)
ALBUMIN SERPL-MCNC: 4.1 G/DL (ref 3.4–5)
ALP BLD-CCNC: 72 U/L (ref 40–129)
ALT SERPL-CCNC: 9 U/L (ref 10–40)
ANION GAP SERPL CALCULATED.3IONS-SCNC: 14 MMOL/L (ref 3–16)
AST SERPL-CCNC: 18 U/L (ref 15–37)
BILIRUB SERPL-MCNC: 0.4 MG/DL (ref 0–1)
BUN BLDV-MCNC: 19 MG/DL (ref 7–20)
CALCIUM SERPL-MCNC: 9.4 MG/DL (ref 8.3–10.6)
CHLORIDE BLD-SCNC: 100 MMOL/L (ref 99–110)
CHOLESTEROL, TOTAL: 139 MG/DL (ref 0–199)
CO2: 24 MMOL/L (ref 21–32)
CREAT SERPL-MCNC: 1 MG/DL (ref 0.6–1.2)
GFR AFRICAN AMERICAN: >60
GFR NON-AFRICAN AMERICAN: 53
GLOBULIN: 3.1 G/DL
GLUCOSE BLD-MCNC: 107 MG/DL (ref 70–99)
HDLC SERPL-MCNC: 67 MG/DL (ref 40–60)
LDL CHOLESTEROL CALCULATED: 55 MG/DL
POTASSIUM SERPL-SCNC: 4.4 MMOL/L (ref 3.5–5.1)
SODIUM BLD-SCNC: 138 MMOL/L (ref 136–145)
TOTAL PROTEIN: 7.2 G/DL (ref 6.4–8.2)
TRIGL SERPL-MCNC: 83 MG/DL (ref 0–150)
VITAMIN B-12: 320 PG/ML (ref 211–911)
VITAMIN D 25-HYDROXY: 49.2 NG/ML
VLDLC SERPL CALC-MCNC: 17 MG/DL

## 2020-03-05 PROCEDURE — 1123F ACP DISCUSS/DSCN MKR DOCD: CPT | Performed by: NURSE PRACTITIONER

## 2020-03-05 PROCEDURE — G8427 DOCREV CUR MEDS BY ELIG CLIN: HCPCS | Performed by: NURSE PRACTITIONER

## 2020-03-05 PROCEDURE — G8400 PT W/DXA NO RESULTS DOC: HCPCS | Performed by: NURSE PRACTITIONER

## 2020-03-05 PROCEDURE — 99214 OFFICE O/P EST MOD 30 MIN: CPT | Performed by: NURSE PRACTITIONER

## 2020-03-05 PROCEDURE — 4040F PNEUMOC VAC/ADMIN/RCVD: CPT | Performed by: NURSE PRACTITIONER

## 2020-03-05 PROCEDURE — 1036F TOBACCO NON-USER: CPT | Performed by: NURSE PRACTITIONER

## 2020-03-05 PROCEDURE — 1090F PRES/ABSN URINE INCON ASSESS: CPT | Performed by: NURSE PRACTITIONER

## 2020-03-05 PROCEDURE — G8484 FLU IMMUNIZE NO ADMIN: HCPCS | Performed by: NURSE PRACTITIONER

## 2020-03-05 PROCEDURE — G8420 CALC BMI NORM PARAMETERS: HCPCS | Performed by: NURSE PRACTITIONER

## 2020-03-05 ASSESSMENT — ENCOUNTER SYMPTOMS: SHORTNESS OF BREATH: 0

## 2020-03-05 NOTE — PROGRESS NOTES
breast mastectomy. VERITO: lifelong. Yes Luz Murdock APRN - CNP        Social History     Tobacco Use    Smoking status: Never Smoker    Smokeless tobacco: Never Used   Substance Use Topics    Alcohol use: No        Vitals:    03/05/20 0955   BP: 118/72   Site: Left Upper Arm   Position: Sitting   Cuff Size: Medium Adult   Pulse: 67   SpO2: 96%   Weight: 111 lb 3.2 oz (50.4 kg)     Estimated body mass index is 22.85 kg/m² as calculated from the following:    Height as of 6/13/19: 4' 10.5\" (1.486 m). Weight as of this encounter: 111 lb 3.2 oz (50.4 kg). Physical Exam  Vitals signs reviewed. Constitutional:       Appearance: Normal appearance. HENT:      Head: Normocephalic. Right Ear: Tympanic membrane, ear canal and external ear normal. Decreased hearing noted. Left Ear: Tympanic membrane, ear canal and external ear normal. Decreased hearing noted. Nose: Nose normal.      Mouth/Throat:      Lips: Pink. Mouth: Mucous membranes are moist.      Pharynx: Oropharynx is clear. Uvula midline. Cardiovascular:      Rate and Rhythm: Normal rate and regular rhythm. Pulses: Normal pulses. Heart sounds: Normal heart sounds, S1 normal and S2 normal.   Pulmonary:      Effort: Pulmonary effort is normal.      Breath sounds: Normal breath sounds and air entry. Musculoskeletal:      Right knee: She exhibits no swelling. No tenderness found. Left knee: She exhibits no swelling. No tenderness found. Right lower leg: No edema. Left lower leg: No edema. Neurological:      Mental Status: She is alert. Psychiatric:         Mood and Affect: Mood normal.         ASSESSMENT/PLAN:  1. Chronic pain of both knees  Stable;  Referral to Dr. Sarah Pittman to discuss joint injections. - Lynn Beauchamp MD, Orthopedic Surgery, [place]  - Vitamin B12; Future    2. Hyperlipidemia  Stable;  Continue current regimen. Labs ordered.   - Comprehensive Metabolic Panel; Future    3. Vitamin D deficiency  Stable;  Continue current regimen. Labs ordered. - Vitamin D 25 Hydroxy; Future    4. Essential hypertension  Stable;  Continue current regimen.  - Lipid Panel; Future  - Vitamin B12; Future    5. Hearing difficulty of both ears  Stable;  Encouraged patient to discuss her concerns with audiology. 6. Mild cognitive impairment  Stable;  Continue to follow recommendations from neurology. Follow Up:     Return in about 6 months (around 9/5/2020).

## 2020-03-10 RX ORDER — METOPROLOL SUCCINATE 50 MG/1
50 TABLET, EXTENDED RELEASE ORAL DAILY
Qty: 30 TABLET | Refills: 0 | OUTPATIENT
Start: 2020-03-10

## 2020-03-10 RX ORDER — ASPIRIN 325 MG/1
TABLET, FILM COATED ORAL
Qty: 30 TABLET | Refills: 0 | OUTPATIENT
Start: 2020-03-10

## 2020-03-30 ENCOUNTER — TELEPHONE (OUTPATIENT)
Dept: ORTHOPEDIC SURGERY | Age: 80
End: 2020-03-30

## 2020-03-30 NOTE — TELEPHONE ENCOUNTER
PT'S DAUGHTER, KALI STARR, STATES SHE'S NOT BRINGING HER MOTHER OUT DUE TO COVID-19   SHE DOESN'T WANT TO SCHEDULE ANY APPOINTMENTS RIGHT NOT---[place]    [phone]

## 2020-03-31 RX ORDER — ASPIRIN 325 MG/1
TABLET, FILM COATED ORAL
Qty: 30 TABLET | Refills: 0 | Status: SHIPPED | OUTPATIENT
Start: 2020-03-31 | End: 2020-04-13

## 2020-03-31 RX ORDER — METOPROLOL SUCCINATE 50 MG/1
50 TABLET, EXTENDED RELEASE ORAL DAILY
Qty: 30 TABLET | Refills: 0 | Status: SHIPPED | OUTPATIENT
Start: 2020-03-31 | End: 2020-04-13

## 2020-03-31 NOTE — TELEPHONE ENCOUNTER
Last OV 3/5/2020   Next OV 9/8/2020    Requested Prescriptions     Pending Prescriptions Disp Refills    metoprolol succinate (TOPROL XL) 50 MG extended release tablet [Pharmacy Med Name: metoprolol succinate ER 50 mg tablet,extended release 24 hr] 30 tablet 0     Sig: Take 1 tablet by mouth daily for blood pressure    ASPIRIN ADULT 325 MG tablet [Pharmacy Med Name: aspirin 325 mg tablet] 30 tablet 0     Sig: Take 1 tablet by mouth daily for heart protection

## 2020-04-13 RX ORDER — METOPROLOL SUCCINATE 50 MG/1
50 TABLET, EXTENDED RELEASE ORAL DAILY
Qty: 30 TABLET | Refills: 0 | Status: SHIPPED | OUTPATIENT
Start: 2020-04-13 | End: 2020-05-08

## 2020-04-13 RX ORDER — ASPIRIN 325 MG/1
TABLET, FILM COATED ORAL
Qty: 30 TABLET | Refills: 0 | Status: SHIPPED | OUTPATIENT
Start: 2020-04-13 | End: 2020-05-08

## 2020-05-08 RX ORDER — ASPIRIN 325 MG/1
TABLET, FILM COATED ORAL
Qty: 30 TABLET | Refills: 0 | Status: SHIPPED | OUTPATIENT
Start: 2020-05-08 | End: 2020-06-05

## 2020-05-08 RX ORDER — ERGOCALCIFEROL 1.25 MG/1
50000 CAPSULE ORAL WEEKLY
Qty: 12 CAPSULE | Refills: 0 | Status: SHIPPED | OUTPATIENT
Start: 2020-05-08 | End: 2020-08-07

## 2020-05-08 RX ORDER — METOPROLOL SUCCINATE 50 MG/1
50 TABLET, EXTENDED RELEASE ORAL DAILY
Qty: 30 TABLET | Refills: 0 | Status: SHIPPED | OUTPATIENT
Start: 2020-05-08 | End: 2020-06-05

## 2020-05-08 RX ORDER — ATORVASTATIN CALCIUM 40 MG/1
40 TABLET, FILM COATED ORAL DAILY
Qty: 90 TABLET | Refills: 0 | Status: SHIPPED | OUTPATIENT
Start: 2020-05-08 | End: 2020-08-07

## 2020-05-08 NOTE — TELEPHONE ENCOUNTER
Atorvastatin & Vitamin D - 2/6/20  Aspirin % Metoprolol - 4/13/20  Last Office Visit 3/5/20  Return in about 6 months (around 9/5/2020).   Pending Appointments 9/8/20

## 2020-06-05 RX ORDER — ASPIRIN 325 MG/1
TABLET, FILM COATED ORAL
Qty: 30 TABLET | Refills: 0 | Status: SHIPPED | OUTPATIENT
Start: 2020-06-05 | End: 2020-07-08

## 2020-06-05 RX ORDER — METOPROLOL SUCCINATE 50 MG/1
50 TABLET, EXTENDED RELEASE ORAL DAILY
Qty: 30 TABLET | Refills: 0 | Status: SHIPPED | OUTPATIENT
Start: 2020-06-05 | End: 2020-07-08

## 2020-07-08 RX ORDER — METOPROLOL SUCCINATE 50 MG/1
50 TABLET, EXTENDED RELEASE ORAL DAILY
Qty: 30 TABLET | Refills: 0 | Status: SHIPPED | OUTPATIENT
Start: 2020-07-08 | End: 2020-08-07

## 2020-07-08 RX ORDER — ASPIRIN 325 MG/1
TABLET, FILM COATED ORAL
Qty: 30 TABLET | Refills: 0 | Status: SHIPPED | OUTPATIENT
Start: 2020-07-08 | End: 2020-08-07

## 2020-08-07 RX ORDER — METOPROLOL SUCCINATE 50 MG/1
50 TABLET, EXTENDED RELEASE ORAL DAILY
Qty: 30 TABLET | Refills: 0 | Status: SHIPPED | OUTPATIENT
Start: 2020-08-07 | End: 2020-10-01

## 2020-08-07 RX ORDER — ASPIRIN 325 MG/1
TABLET, FILM COATED ORAL
Qty: 30 TABLET | Refills: 0 | Status: SHIPPED | OUTPATIENT
Start: 2020-08-07 | End: 2020-09-08

## 2020-08-07 RX ORDER — ATORVASTATIN CALCIUM 40 MG/1
40 TABLET, FILM COATED ORAL DAILY
Qty: 90 TABLET | Refills: 0 | Status: SHIPPED | OUTPATIENT
Start: 2020-08-07 | End: 2020-11-27

## 2020-08-07 RX ORDER — ERGOCALCIFEROL 1.25 MG/1
50000 CAPSULE ORAL WEEKLY
Qty: 12 CAPSULE | Refills: 0 | Status: SHIPPED | OUTPATIENT
Start: 2020-08-07 | End: 2020-11-27

## 2020-08-14 ENCOUNTER — TELEPHONE (OUTPATIENT)
Dept: FAMILY MEDICINE CLINIC | Age: 80
End: 2020-08-14

## 2020-09-08 ENCOUNTER — HOSPITAL ENCOUNTER (OUTPATIENT)
Dept: GENERAL RADIOLOGY | Age: 80
Discharge: HOME | End: 2020-09-08
Payer: COMMERCIAL

## 2020-09-08 ENCOUNTER — OFFICE VISIT (OUTPATIENT)
Dept: FAMILY MEDICINE CLINIC | Age: 80
End: 2020-09-08
Payer: COMMERCIAL

## 2020-09-08 VITALS
WEIGHT: 111 LBS | DIASTOLIC BLOOD PRESSURE: 60 MMHG | BODY MASS INDEX: 22.8 KG/M2 | HEART RATE: 89 BPM | SYSTOLIC BLOOD PRESSURE: 120 MMHG | OXYGEN SATURATION: 96 % | RESPIRATION RATE: 16 BRPM

## 2020-09-08 PROCEDURE — 1036F TOBACCO NON-USER: CPT | Performed by: FAMILY MEDICINE

## 2020-09-08 PROCEDURE — G8400 PT W/DXA NO RESULTS DOC: HCPCS | Performed by: FAMILY MEDICINE

## 2020-09-08 PROCEDURE — 4040F PNEUMOC VAC/ADMIN/RCVD: CPT | Performed by: FAMILY MEDICINE

## 2020-09-08 PROCEDURE — 99214 OFFICE O/P EST MOD 30 MIN: CPT | Performed by: FAMILY MEDICINE

## 2020-09-08 PROCEDURE — G8428 CUR MEDS NOT DOCUMENT: HCPCS | Performed by: FAMILY MEDICINE

## 2020-09-08 PROCEDURE — 1123F ACP DISCUSS/DSCN MKR DOCD: CPT | Performed by: FAMILY MEDICINE

## 2020-09-08 PROCEDURE — G8420 CALC BMI NORM PARAMETERS: HCPCS | Performed by: FAMILY MEDICINE

## 2020-09-08 PROCEDURE — 1090F PRES/ABSN URINE INCON ASSESS: CPT | Performed by: FAMILY MEDICINE

## 2020-09-08 PROCEDURE — 71046 X-RAY EXAM CHEST 2 VIEWS: CPT

## 2020-09-08 RX ORDER — ASPIRIN 81 MG/1
81 TABLET ORAL 2 TIMES DAILY
COMMUNITY
Start: 2020-08-21 | End: 2021-06-23

## 2020-09-08 RX ORDER — SENNA AND DOCUSATE SODIUM 50; 8.6 MG/1; MG/1
2 TABLET, FILM COATED ORAL DAILY PRN
COMMUNITY
Start: 2020-08-21

## 2020-09-08 RX ORDER — ERGOCALCIFEROL (VITAMIN D2) 1250 MCG
50000 CAPSULE ORAL
COMMUNITY
End: 2020-09-08

## 2020-09-08 ASSESSMENT — ENCOUNTER SYMPTOMS
SHORTNESS OF BREATH: 0
BLOOD IN STOOL: 0
COUGH: 0
NAUSEA: 0
SORE THROAT: 0
VOMITING: 0
ABDOMINAL PAIN: 0

## 2020-09-08 NOTE — PATIENT INSTRUCTIONS
Patient Education      Go to the ER ASAP if you develop any chest pain, shortness of breath, facial droop, slurred speech, weakness/numbness in your arms or legs or double vision    Learning About Using an Incentive Spirometer  What is an incentive spirometer? An incentive spirometer is a handheld device that exercises your lungs and measures how much air you can breathe in. It tells you and your doctor how well your lungs are working. The spirometer can help you practice taking deep breaths. Deep breaths can help open your airways and prevent fluid or mucus from building up in your lungs, and make it easier for you to breathe. Using the device can help prevent serious lung infections like pneumonia, improve your breathing after you've had pneumonia or surgery, and keep your airways open and lungs active if you can't get out of bed. How do you use an incentive spirometer? When you use an incentive spirometer, you breathe in air through a tube that is connected to a large air column containing a piston or ball. As you breathe in, the piston or ball inside the column moves up. The height of the piston or ball shows how much air you breathed in. You may feel lightheaded when you breathe in deeply for this exercise. If you feel dizzy or feel like you're going to pass out, stop the exercise and rest.  Each time you do this exercise, keep track of your progress by writing down how high the piston or ball moves up the column. 1. Move the slider on the outside of the large column to the level that you want to reach or that your doctor recommended. 2. Sit or stand up straight, and hold the spirometer in front of you. Be sure to keep it level. 3. To start, breathe out normally. Then close your lips tightly around the mouthpiece. Make sure that you don't block the mouthpiece with your tongue. 4. Take a slow, deep breath. Breathe in as deeply as you can.  As you breathe in, the piston or ball inside the large column will move up. Try to move the piston or ball as high up as you can or to the level your doctor recommended. When you can't breathe in anymore, hold your breath for 2 to 5 seconds. 5. Relax, take the mouthpiece out of your mouth, and breathe out normally. 6. Repeat steps 1 through 5 as many times as your doctor tells you to.  7. After you've taken the recommended number of breaths, try to cough a few times. This will help loosen any mucus that has built up in your lungs. It will make it easier for you to breathe. If you just had surgery on your belly or chest, hold a pillow over your cut (incision) when you cough. Follow-up care is a key part of your treatment and safety. Be sure to make and go to all appointments, and call your doctor if you are having problems. It's also a good idea to know your test results and keep a list of the medicines you take. Where can you learn more? Go to https://[employer]. org and sign in to your [employer] account. Enter F926 in the [facility] box to learn more about \"Learning About Using an Incentive Spirometer. \"     If you do not have an account, please click on the \"Sign Up Now\" link. Current as of: February 24, 2020               Content Version: 12.5  © 7998-7545 Healthwise, Incorporated. Care instructions adapted under license by [facility] ([facility]). If you have questions about a medical condition or this instruction, always ask your healthcare professional. [place] [address] any warranty or liability for your use of this information.

## 2020-09-08 NOTE — PROGRESS NOTES
Chief Complaint   Patient presents with    Post-op Problem      R knee replacement 8/19/2020 slight pain Dr Nabila Wheeler,  ID  Daune Ormond is a 78 y.o. female who presents Pt did have an accidental fall last month and fractured her R femur and underwent a successful R TKR and was in a rehab facility for only a couple days as she was not pleased with the facility. Pt is now at her home alone but her daughter is staying with her for assistance. Pt is scheduled to see Ortho for followup visit tomorrow. Pt has not been doing incentive spirometry at home      Patient has a past medical history significant for HTN, TIA 2013 and dyslipidemia and is on ASA, lipitor and toprol xl medications though her ASA dosage was decreased to 81 mg during recent hospitalization    Pt is a nonsmoker and denies alcohol use      Discharge summary Hospital Course: 08/26/2020  Ms. Nisha Macedo was directly admitted to [facility] from [facility] in [place] for [place] Right femur fracture after mechanical fall. She was in [place] upon arrival,she was sent to CT scan RLE as well as venous duplex of RLE, given she had been in bed for multiple days prior to arrival. The Hospitalist service was consulted for preop evaluation and assistance with management of multiple comorbid conditions - see notes. She was found to have a superficial venous thrombosis in the RLE. Her daughter translate for her. She was taken to OR on 8-19-20 for right TKA. Preop she was given regional nerve blocks and was admitted back to [facility] for postsurgical care. She was placed on the total knee critical pathway. Physical and occupational therapy were consulted for evaluation. She was placed on aspirin for DVT prophylaxis. POD 1 jaylin was concerned about taking care of her mother at home and not having any help, thus SW was consulted to assist with D/C planning.  POD 2 she had more pain as block wore off and did not do as well as day prior with therapy. SW meet with Pt and her daughter to discuss options. POD 3 they picked a few SNF's and SW made referral. POD 4 & 5 were the weekend thus insurance was closed. She continued with PT/OT and continued to progress. POD 6 precert was pending, POD 7 we received precert and she did well with physical therapy and was discharged to SNF in stable condition. Review of Systems   Constitutional: Positive for appetite change (anorexia since surgery). Negative for fever. HENT: Negative for nosebleeds and sore throat. Eyes:        Negative blurred vision or diplopia   Respiratory: Negative for cough and shortness of breath. Cardiovascular: Negative for chest pain and palpitations. Gastrointestinal: Negative for abdominal pain, blood in stool, nausea and vomiting. Negative melena   Genitourinary: Negative for dysuria and hematuria. Musculoskeletal: Positive for arthralgias (R knee and uses walker for ambulation). Skin: Negative for rash. Neurological: Negative for dizziness, seizures, syncope, speech difficulty, weakness and headaches. Psychiatric/Behavioral: Positive for dysphoric mood (since surgery). The patient is not nervous/anxious. Vitals:    09/08/20 1454   BP: 120/60   Pulse: 89   Resp: 16   SpO2: 96%         Physical Exam  Vitals signs reviewed. Constitutional:       General: She is not in acute distress. HENT:      Mouth/Throat:      Mouth: Mucous membranes are moist.      Pharynx: Oropharynx is clear. Eyes:      General: No scleral icterus. Comments: Pink conjunctivae    Cardiovascular:      Heart sounds: Normal heart sounds. No murmur. No friction rub. No gallop. Pulmonary:      Effort: Pulmonary effort is normal.      Comments: Positive dry crackles noted in L lower lobe  Abdominal:      Palpations: Abdomen is soft. Tenderness: There is no abdominal tenderness.    Musculoskeletal:      Comments: No leg edema noted B/L; 5 inch surgical wound noted over R breathe. Using the device can help prevent serious lung infections like pneumonia, improve your breathing after you've had pneumonia or surgery, and keep your airways open and lungs active if you can't get out of bed. How do you use an incentive spirometer? When you use an incentive spirometer, you breathe in air through a tube that is connected to a large air column containing a piston or ball. As you breathe in, the piston or ball inside the column moves up. The height of the piston or ball shows how much air you breathed in. You may feel lightheaded when you breathe in deeply for this exercise. If you feel dizzy or feel like you're going to pass out, stop the exercise and rest.  Each time you do this exercise, keep track of your progress by writing down how high the piston or ball moves up the column. 1. Move the slider on the outside of the large column to the level that you want to reach or that your doctor recommended. 2. Sit or stand up straight, and hold the spirometer in front of you. Be sure to keep it level. 3. To start, breathe out normally. Then close your lips tightly around the mouthpiece. Make sure that you don't block the mouthpiece with your tongue. 4. Take a slow, deep breath. Breathe in as deeply as you can. As you breathe in, the piston or ball inside the large column will move up. Try to move the piston or ball as high up as you can or to the level your doctor recommended. When you can't breathe in anymore, hold your breath for 2 to 5 seconds. 5. Relax, take the mouthpiece out of your mouth, and breathe out normally. 6. Repeat steps 1 through 5 as many times as your doctor tells you to.  7. After you've taken the recommended number of breaths, try to cough a few times. This will help loosen any mucus that has built up in your lungs. It will make it easier for you to breathe.  If you just had surgery on your belly or chest, hold a pillow over your cut (incision) when you

## 2020-09-09 ENCOUNTER — TELEPHONE (OUTPATIENT)
Dept: FAMILY MEDICINE CLINIC | Age: 80
End: 2020-09-09

## 2020-09-09 NOTE — TELEPHONE ENCOUNTER
----- Message from Ana Patino sent at 9/9/2020  8:20 AM EDT -----  Subject: Results Request    QUESTIONS  Which lab or imaging result is the patient calling about? chest xray  Which provider ordered the test? [facility]   [facility]  Date the test was performed? 2020-09-08  Additional Information for Provider? received a message in [employer] that   the test results couldn't be displayed; advised patient to call practice   for results  ---------------------------------------------------------------------------  --------------  [address]  What is the best way for the office to contact you? OK to leave message on   voicemail  Preferred Call Back Phone Number?  [phone]

## 2020-10-01 RX ORDER — ASPIRIN 325 MG/1
TABLET, FILM COATED ORAL
Qty: 30 TABLET | Refills: 0 | Status: SHIPPED | OUTPATIENT
Start: 2020-10-01 | End: 2020-10-27

## 2020-10-01 RX ORDER — METOPROLOL SUCCINATE 50 MG/1
50 TABLET, EXTENDED RELEASE ORAL DAILY
Qty: 30 TABLET | Refills: 0 | Status: SHIPPED | OUTPATIENT
Start: 2020-10-01 | End: 2020-10-27

## 2020-10-01 NOTE — TELEPHONE ENCOUNTER
Last OV 9/8/2020   Return in about 2 months (around 11/8/2020).    Next OV Visit date not found    Asprin 8/21/2020  Metoprolol 8/7/2020

## 2020-10-27 RX ORDER — METOPROLOL SUCCINATE 50 MG/1
50 TABLET, EXTENDED RELEASE ORAL DAILY
Qty: 30 TABLET | Refills: 0 | Status: SHIPPED | OUTPATIENT
Start: 2020-10-27 | End: 2020-11-27

## 2020-10-27 RX ORDER — ASPIRIN 325 MG/1
TABLET, FILM COATED ORAL
Qty: 30 TABLET | Refills: 0 | Status: SHIPPED | OUTPATIENT
Start: 2020-10-27 | End: 2020-11-27

## 2020-11-27 RX ORDER — ASPIRIN 325 MG/1
TABLET, FILM COATED ORAL
Qty: 90 TABLET | Refills: 0 | Status: SHIPPED | OUTPATIENT
Start: 2020-11-27 | End: 2021-06-23 | Stop reason: SDUPTHER

## 2020-11-27 RX ORDER — ATORVASTATIN CALCIUM 40 MG/1
40 TABLET, FILM COATED ORAL DAILY
Qty: 90 TABLET | Refills: 0 | Status: SHIPPED | OUTPATIENT
Start: 2020-11-27 | End: 2020-12-29

## 2020-11-27 RX ORDER — METOPROLOL SUCCINATE 50 MG/1
50 TABLET, EXTENDED RELEASE ORAL DAILY
Qty: 90 TABLET | Refills: 0 | Status: SHIPPED | OUTPATIENT
Start: 2020-11-27 | End: 2020-12-29

## 2020-11-27 RX ORDER — ERGOCALCIFEROL 1.25 MG/1
CAPSULE ORAL
Qty: 12 CAPSULE | Refills: 0 | Status: SHIPPED | OUTPATIENT
Start: 2020-11-27 | End: 2020-12-29

## 2020-12-29 RX ORDER — ATORVASTATIN CALCIUM 40 MG/1
TABLET, FILM COATED ORAL
Qty: 90 TABLET | Refills: 0 | Status: SHIPPED | OUTPATIENT
Start: 2020-12-29 | End: 2021-06-23 | Stop reason: SDUPTHER

## 2020-12-29 RX ORDER — ERGOCALCIFEROL 1.25 MG/1
CAPSULE ORAL
Qty: 12 CAPSULE | Refills: 0 | Status: SHIPPED | OUTPATIENT
Start: 2020-12-29 | End: 2021-06-23 | Stop reason: SDUPTHER

## 2020-12-29 RX ORDER — METOPROLOL SUCCINATE 50 MG/1
TABLET, EXTENDED RELEASE ORAL
Qty: 90 TABLET | Refills: 0 | Status: SHIPPED | OUTPATIENT
Start: 2020-12-29 | End: 2021-06-23 | Stop reason: SDUPTHER

## 2020-12-29 NOTE — TELEPHONE ENCOUNTER
Last Fill 11/27/20  Last Office Visit 9/8/20  Return in about 2 months (around 11/8/2020).    No Pending Appointments

## 2021-01-20 ENCOUNTER — IMMUNIZATION (OUTPATIENT)
Dept: PRIMARY CARE CLINIC | Age: 81
End: 2021-01-20
Payer: COMMERCIAL

## 2021-01-20 PROCEDURE — 91300 COVID-19, PFIZER VACCINE 30MCG/0.3ML DOSE: CPT | Performed by: FAMILY MEDICINE

## 2021-01-20 PROCEDURE — 0001A PR IMM ADMN SARSCOV2 30MCG/0.3ML DIL RECON 1ST DOSE: CPT | Performed by: FAMILY MEDICINE

## 2021-02-10 ENCOUNTER — IMMUNIZATION (OUTPATIENT)
Dept: PRIMARY CARE CLINIC | Age: 81
End: 2021-02-10
Payer: COMMERCIAL

## 2021-02-10 PROCEDURE — 91300 COVID-19, PFIZER VACCINE 30MCG/0.3ML DOSE: CPT | Performed by: FAMILY MEDICINE

## 2021-02-10 PROCEDURE — 0002A COVID-19, PFIZER VACCINE 30MCG/0.3ML DOSE: CPT | Performed by: FAMILY MEDICINE

## 2021-05-12 ENCOUNTER — OFFICE VISIT (OUTPATIENT)
Dept: FAMILY MEDICINE CLINIC | Age: 81
End: 2021-05-12
Payer: COMMERCIAL

## 2021-05-12 VITALS
WEIGHT: 109 LBS | OXYGEN SATURATION: 97 % | HEART RATE: 70 BPM | DIASTOLIC BLOOD PRESSURE: 70 MMHG | HEIGHT: 58 IN | SYSTOLIC BLOOD PRESSURE: 118 MMHG | BODY MASS INDEX: 22.88 KG/M2

## 2021-05-12 DIAGNOSIS — L02.31 ABSCESS OF BUTTOCK: ICD-10-CM

## 2021-05-12 PROCEDURE — 99213 OFFICE O/P EST LOW 20 MIN: CPT | Performed by: FAMILY MEDICINE

## 2021-05-12 PROCEDURE — G8420 CALC BMI NORM PARAMETERS: HCPCS | Performed by: FAMILY MEDICINE

## 2021-05-12 PROCEDURE — 1036F TOBACCO NON-USER: CPT | Performed by: FAMILY MEDICINE

## 2021-05-12 PROCEDURE — 1090F PRES/ABSN URINE INCON ASSESS: CPT | Performed by: FAMILY MEDICINE

## 2021-05-12 PROCEDURE — G8427 DOCREV CUR MEDS BY ELIG CLIN: HCPCS | Performed by: FAMILY MEDICINE

## 2021-05-12 PROCEDURE — 1123F ACP DISCUSS/DSCN MKR DOCD: CPT | Performed by: FAMILY MEDICINE

## 2021-05-12 PROCEDURE — 4040F PNEUMOC VAC/ADMIN/RCVD: CPT | Performed by: FAMILY MEDICINE

## 2021-05-12 PROCEDURE — G8400 PT W/DXA NO RESULTS DOC: HCPCS | Performed by: FAMILY MEDICINE

## 2021-05-12 RX ORDER — SULFAMETHOXAZOLE AND TRIMETHOPRIM 800; 160 MG/1; MG/1
1 TABLET ORAL 2 TIMES DAILY
Qty: 20 TABLET | Refills: 0 | Status: SHIPPED | OUTPATIENT
Start: 2021-05-12 | End: 2021-05-20 | Stop reason: SINTOL

## 2021-05-12 SDOH — ECONOMIC STABILITY: FOOD INSECURITY: WITHIN THE PAST 12 MONTHS, THE FOOD YOU BOUGHT JUST DIDN'T LAST AND YOU DIDN'T HAVE MONEY TO GET MORE.: NEVER TRUE

## 2021-05-12 SDOH — ECONOMIC STABILITY: TRANSPORTATION INSECURITY
IN THE PAST 12 MONTHS, HAS THE LACK OF TRANSPORTATION KEPT YOU FROM MEDICAL APPOINTMENTS OR FROM GETTING MEDICATIONS?: NO

## 2021-05-12 SDOH — ECONOMIC STABILITY: INCOME INSECURITY: HOW HARD IS IT FOR YOU TO PAY FOR THE VERY BASICS LIKE FOOD, HOUSING, MEDICAL CARE, AND HEATING?: NOT HARD AT ALL

## 2021-05-12 SDOH — ECONOMIC STABILITY: FOOD INSECURITY: WITHIN THE PAST 12 MONTHS, YOU WORRIED THAT YOUR FOOD WOULD RUN OUT BEFORE YOU GOT MONEY TO BUY MORE.: NEVER TRUE

## 2021-05-12 SDOH — ECONOMIC STABILITY: TRANSPORTATION INSECURITY
IN THE PAST 12 MONTHS, HAS LACK OF TRANSPORTATION KEPT YOU FROM MEETINGS, WORK, OR FROM GETTING THINGS NEEDED FOR DAILY LIVING?: NO

## 2021-05-12 ASSESSMENT — PATIENT HEALTH QUESTIONNAIRE - PHQ9
SUM OF ALL RESPONSES TO PHQ QUESTIONS 1-9: 0
2. FEELING DOWN, DEPRESSED OR HOPELESS: 0
SUM OF ALL RESPONSES TO PHQ QUESTIONS 1-9: 0
1. LITTLE INTEREST OR PLEASURE IN DOING THINGS: 0
SUM OF ALL RESPONSES TO PHQ QUESTIONS 1-9: 0

## 2021-05-12 NOTE — PROGRESS NOTES
Rabia Sloan (:  1940) is a 78 y.o. female,Established patient, here for evaluation of the following chief complaint(s):  Follow-up and Cyst (on butt )      ASSESSMENT/PLAN:  1. Abscess of buttock  has drained on it's own. Covering with antibiotics  Advised to call back directly if there are further questions, or if these symptoms fail to improve as anticipated or worsen. No follow-ups on file. SUBJECTIVE/OBJECTIVE:  HPI   Pt is a of 78 y.o. female comes in today with   Chief Complaint   Patient presents with    Follow-up    Cyst     on butt      Cyst for the past 8 days. Just told family y/d. Has not had this before. In a lot of pain, but much less since drained  No f/c. Feeling well  Review of Systems    Physical Exam  nonfluctuant tender nodule          An electronic signature was used to authenticate this note.     --Renetta Colon MD

## 2021-05-20 RX ORDER — OMEPRAZOLE 20 MG/1
20 CAPSULE, DELAYED RELEASE ORAL
Qty: 30 CAPSULE | Refills: 0 | Status: SHIPPED | OUTPATIENT
Start: 2021-05-20 | End: 2021-06-23 | Stop reason: SDUPTHER

## 2021-05-20 RX ORDER — ONDANSETRON 4 MG/1
4 TABLET, FILM COATED ORAL 3 TIMES DAILY PRN
Qty: 15 TABLET | Refills: 0 | Status: SHIPPED | OUTPATIENT
Start: 2021-05-20

## 2021-06-23 DIAGNOSIS — Z90.12 HISTORY OF MASTECTOMY, LEFT: ICD-10-CM

## 2021-06-23 DIAGNOSIS — E55.9 VITAMIN D DEFICIENCY: ICD-10-CM

## 2021-06-23 DIAGNOSIS — I10 ESSENTIAL HYPERTENSION: ICD-10-CM

## 2021-06-23 DIAGNOSIS — E78.5 HYPERLIPIDEMIA, UNSPECIFIED HYPERLIPIDEMIA TYPE: ICD-10-CM

## 2021-06-23 DIAGNOSIS — Z86.73 HISTORY OF STROKE: ICD-10-CM

## 2021-06-23 RX ORDER — ERGOCALCIFEROL 1.25 MG/1
50000 CAPSULE ORAL WEEKLY
Qty: 12 CAPSULE | Refills: 0 | Status: SHIPPED | OUTPATIENT
Start: 2021-06-23 | End: 2021-06-29

## 2021-06-23 RX ORDER — METOPROLOL SUCCINATE 50 MG/1
50 TABLET, EXTENDED RELEASE ORAL DAILY
Qty: 90 TABLET | Refills: 0 | Status: SHIPPED | OUTPATIENT
Start: 2021-06-23 | End: 2021-06-29

## 2021-06-23 RX ORDER — ATORVASTATIN CALCIUM 40 MG/1
40 TABLET, FILM COATED ORAL DAILY
Qty: 90 TABLET | Refills: 0 | Status: SHIPPED | OUTPATIENT
Start: 2021-06-23 | End: 2021-06-29

## 2021-06-23 RX ORDER — OMEPRAZOLE 20 MG/1
20 CAPSULE, DELAYED RELEASE ORAL
Qty: 30 CAPSULE | Refills: 0 | Status: SHIPPED | OUTPATIENT
Start: 2021-06-23 | End: 2021-06-29

## 2021-06-23 RX ORDER — ASPIRIN 325 MG
325 TABLET ORAL DAILY
Qty: 90 TABLET | Refills: 0 | Status: SHIPPED | OUTPATIENT
Start: 2021-06-23 | End: 2021-06-29

## 2021-06-23 NOTE — TELEPHONE ENCOUNTER
Medication:   Requested Prescriptions     Pending Prescriptions Disp Refills    Misc. Devices MISC 1 Device 0     Sig: Please provide two mastectomy bras of patient's choice. Dx: s/p left breast mastectomy. VERITO: lifelong.  aspirin (ASPIRIN ADULT) 325 MG tablet 90 tablet 0     Sig: Take 1 tablet by mouth daily    metoprolol succinate (TOPROL XL) 50 MG extended release tablet 90 tablet 1     Sig: Take 1 tablet by mouth daily    atorvastatin (LIPITOR) 40 MG tablet 90 tablet 1     Sig: Take 1 tablet by mouth daily    vitamin D (ERGOCALCIFEROL) 1.25 MG (49196 UT) CAPS capsule 12 capsule 1     Sig: Take 1 capsule by mouth once a week    omeprazole (PRILOSEC) 20 MG delayed release capsule 30 capsule 0     Sig: Take 1 capsule by mouth every morning (before breakfast)        Last Filled:  Omeprazole (5/20/21) / Metoprolol, Atorvastatin & Vitamin D (12/29/20) / Aspirin (11/27/20) / Jalil Clark.  Device (3/14/19)  Last appt: 5/12/2021   Next appt: none

## 2021-06-24 NOTE — TELEPHONE ENCOUNTER
Left message for patient's daughter to call back. Where would she like this sent instead? I would suggest to contact insurance to see who would cover.

## 2021-06-25 NOTE — TELEPHONE ENCOUNTER
Patient's daughter informed. She will check with insurance to see who is covered. Will call back and let us know.

## 2021-06-28 DIAGNOSIS — Z86.73 HISTORY OF STROKE: ICD-10-CM

## 2021-06-28 DIAGNOSIS — I10 ESSENTIAL HYPERTENSION: ICD-10-CM

## 2021-06-28 DIAGNOSIS — E55.9 VITAMIN D DEFICIENCY: ICD-10-CM

## 2021-06-28 DIAGNOSIS — E78.5 HYPERLIPIDEMIA, UNSPECIFIED HYPERLIPIDEMIA TYPE: ICD-10-CM

## 2021-06-28 NOTE — TELEPHONE ENCOUNTER
Last OV 5/12/2021   Next OV Visit date not found    Requested Prescriptions     Pending Prescriptions Disp Refills    aspirin 325 MG tablet [Pharmacy Med Name: aspirin 325 mg tablet] 90 tablet 0     Sig: TAKE 1 TABLET BY MOUTH EVERY DAY    omeprazole (PRILOSEC) 20 MG delayed release capsule [Pharmacy Med Name: omeprazole 20 mg capsule,delayed release] 30 capsule 0     Sig: TAKE 1 CAPSULE BY MOUTH EVERY MORNING BEFORE BREAKFAST    vitamin D (ERGOCALCIFEROL) 1.25 MG (74183 UT) CAPS capsule [Pharmacy Med Name: ergocalciferol (vitamin D2) 1,250 mcg (50,000 unit) capsule] 12 capsule 0     Sig: TAKE 1 CAPSULE BY MOUTH EVERY WEEK    metoprolol succinate (TOPROL XL) 50 MG extended release tablet [Pharmacy Med Name: metoprolol succinate ER 50 mg tablet,extended release 24 hr] 90 tablet 0     Sig: TAKE 1 TABLET BY MOUTH EVERY DAY    atorvastatin (LIPITOR) 40 MG tablet [Pharmacy Med Name: atorvastatin 40 mg tablet] 90 tablet 0     Sig: TAKE 1 TABLET BY MOUTH EVERY DAY

## 2021-06-29 RX ORDER — OMEPRAZOLE 20 MG/1
CAPSULE, DELAYED RELEASE ORAL
Qty: 30 CAPSULE | Refills: 0 | Status: SHIPPED | OUTPATIENT
Start: 2021-06-29 | End: 2021-12-28

## 2021-06-29 RX ORDER — METOPROLOL SUCCINATE 50 MG/1
TABLET, EXTENDED RELEASE ORAL
Qty: 90 TABLET | Refills: 0 | Status: SHIPPED | OUTPATIENT
Start: 2021-06-29 | End: 2021-12-28

## 2021-06-29 RX ORDER — ASPIRIN 325 MG
TABLET ORAL
Qty: 90 TABLET | Refills: 0 | Status: SHIPPED | OUTPATIENT
Start: 2021-06-29 | End: 2021-12-27 | Stop reason: SDUPTHER

## 2021-06-29 RX ORDER — ATORVASTATIN CALCIUM 40 MG/1
TABLET, FILM COATED ORAL
Qty: 90 TABLET | Refills: 0 | Status: SHIPPED | OUTPATIENT
Start: 2021-06-29 | End: 2021-12-28

## 2021-06-29 RX ORDER — ERGOCALCIFEROL 1.25 MG/1
CAPSULE ORAL
Qty: 12 CAPSULE | Refills: 0 | Status: SHIPPED | OUTPATIENT
Start: 2021-06-29 | End: 2021-12-28

## 2021-12-27 DIAGNOSIS — Z86.73 HISTORY OF STROKE: ICD-10-CM

## 2021-12-27 DIAGNOSIS — Z90.12 HISTORY OF MASTECTOMY, LEFT: ICD-10-CM

## 2021-12-27 DIAGNOSIS — I10 ESSENTIAL HYPERTENSION: ICD-10-CM

## 2021-12-27 DIAGNOSIS — E55.9 VITAMIN D DEFICIENCY: ICD-10-CM

## 2021-12-27 DIAGNOSIS — E78.5 HYPERLIPIDEMIA, UNSPECIFIED HYPERLIPIDEMIA TYPE: ICD-10-CM

## 2021-12-28 RX ORDER — ASPIRIN 325 MG
325 TABLET ORAL DAILY
Qty: 90 TABLET | Refills: 0 | Status: SHIPPED | OUTPATIENT
Start: 2021-12-28

## 2021-12-28 RX ORDER — OMEPRAZOLE 20 MG/1
20 CAPSULE, DELAYED RELEASE ORAL DAILY
Qty: 30 CAPSULE | Refills: 0 | Status: SHIPPED | OUTPATIENT
Start: 2021-12-28 | End: 2022-03-17

## 2021-12-28 RX ORDER — ATORVASTATIN CALCIUM 40 MG/1
40 TABLET, FILM COATED ORAL DAILY
Qty: 90 TABLET | Refills: 0 | Status: SHIPPED | OUTPATIENT
Start: 2021-12-28 | End: 2022-03-22

## 2021-12-28 RX ORDER — ERGOCALCIFEROL 1.25 MG/1
50000 CAPSULE ORAL WEEKLY
Qty: 12 CAPSULE | Refills: 0 | Status: SHIPPED | OUTPATIENT
Start: 2021-12-28 | End: 2022-03-22

## 2021-12-28 RX ORDER — METOPROLOL SUCCINATE 50 MG/1
50 TABLET, EXTENDED RELEASE ORAL DAILY
Qty: 90 TABLET | Refills: 0 | Status: SHIPPED | OUTPATIENT
Start: 2021-12-28 | End: 2022-03-22

## 2021-12-28 NOTE — TELEPHONE ENCOUNTER
Medication:   Requested Prescriptions     Pending Prescriptions Disp Refills    Misc. Devices MISC       Sig: Please provide two mastectomy bras of patient's choice. Dx: s/p left breast mastectomy. VERITO: lifelong.  aspirin 325 MG tablet 90 tablet 0    omeprazole (PRILOSEC) 20 MG delayed release capsule 30 capsule 0    vitamin D (ERGOCALCIFEROL) 1.25 MG (12966 UT) CAPS capsule 12 capsule 0    metoprolol succinate (TOPROL XL) 50 MG extended release tablet 90 tablet 0    atorvastatin (LIPITOR) 40 MG tablet 90 tablet 0        Last Filled:  06/23/2021    Patient Phone Number: [phone] (home)     Last appt: 5/12/2021   Next appt: Visit date not found    Last OARRS: No flowsheet data found.

## 2022-01-21 ENCOUNTER — OFFICE VISIT (OUTPATIENT)
Dept: FAMILY MEDICINE CLINIC | Age: 82
End: 2022-01-21
Payer: COMMERCIAL

## 2022-01-21 VITALS
TEMPERATURE: 97.3 F | HEART RATE: 78 BPM | SYSTOLIC BLOOD PRESSURE: 125 MMHG | DIASTOLIC BLOOD PRESSURE: 80 MMHG | BODY MASS INDEX: 23.3 KG/M2 | WEIGHT: 111 LBS | HEIGHT: 58 IN | OXYGEN SATURATION: 96 %

## 2022-01-21 DIAGNOSIS — M25.562 ACUTE PAIN OF LEFT KNEE: Primary | ICD-10-CM

## 2022-01-21 DIAGNOSIS — Z91.81 HISTORY OF FALLING: ICD-10-CM

## 2022-01-21 DIAGNOSIS — Z90.12 HISTORY OF MASTECTOMY, LEFT: ICD-10-CM

## 2022-01-21 PROCEDURE — G8484 FLU IMMUNIZE NO ADMIN: HCPCS | Performed by: NURSE PRACTITIONER

## 2022-01-21 PROCEDURE — 1123F ACP DISCUSS/DSCN MKR DOCD: CPT | Performed by: NURSE PRACTITIONER

## 2022-01-21 PROCEDURE — 4040F PNEUMOC VAC/ADMIN/RCVD: CPT | Performed by: NURSE PRACTITIONER

## 2022-01-21 PROCEDURE — G8427 DOCREV CUR MEDS BY ELIG CLIN: HCPCS | Performed by: NURSE PRACTITIONER

## 2022-01-21 PROCEDURE — 99213 OFFICE O/P EST LOW 20 MIN: CPT | Performed by: NURSE PRACTITIONER

## 2022-01-21 PROCEDURE — G8420 CALC BMI NORM PARAMETERS: HCPCS | Performed by: NURSE PRACTITIONER

## 2022-01-21 PROCEDURE — 1036F TOBACCO NON-USER: CPT | Performed by: NURSE PRACTITIONER

## 2022-01-21 PROCEDURE — 1090F PRES/ABSN URINE INCON ASSESS: CPT | Performed by: NURSE PRACTITIONER

## 2022-01-21 PROCEDURE — G8400 PT W/DXA NO RESULTS DOC: HCPCS | Performed by: NURSE PRACTITIONER

## 2022-01-21 NOTE — PROGRESS NOTES
Tasha Stiles (:  1940) is a 78 y.o. female,Established patient, here for evaluation of the following chief complaint(s):  Joint Swelling (left foot)         ASSESSMENT/PLAN:  1. Acute pain of left knee  Stable;  X-ray ordered. Encouraged patient to ice and elevated. Discussed using an assistive device for walking. Dicussed possibly a walker PRN. Granddaughter will ask daughter if she has one.  -     XR KNEE LEFT (3 VIEWS); Future  2. History of falling  Stable;  Encouraged patient to begin using an assistive device for walking. 3. History of mastectomy, left  Stable; Rx of mastectomy bra given to patient. -     Misc. Devices MISC; Disp-1 each, R-0, PrintPlease provide two mastectomy bras of patient's choice. Dx: s/p left breast mastectomy. VERITO: lifelong. Return if symptoms worsen or fail to improve. Subjective   SUBJECTIVE/OBJECTIVE:  HPI  Arline Petit recently- this past week 2-3 times  Left knee started hurting- started hurting after fall  Per patient when she was walking it gave out  It pops and hurts  No swelling  When she gets up and walks- feels like she is going to fall  Can feel it worse when bending  Lying down is ok  Has not tried any ice    Right knee was replaced after a fall  Review of Systems       Objective   Physical Exam  Vitals reviewed. Constitutional:       Appearance: Normal appearance. HENT:      Head: Normocephalic. Right Ear: External ear normal.      Left Ear: External ear normal.   Musculoskeletal:      Left knee: Crepitus present. No swelling, effusion, erythema or ecchymosis. Decreased range of motion. Tenderness present. Right lower leg: No edema. Left lower leg: No edema. Neurological:      Mental Status: She is alert. Psychiatric:         Mood and Affect: Mood normal.       An electronic signature was used to authenticate this note.     --BLAISE Brandon - CNP

## 2022-01-24 ENCOUNTER — HOSPITAL ENCOUNTER (OUTPATIENT)
Dept: GENERAL RADIOLOGY | Age: 82
Discharge: HOME | End: 2022-01-24
Payer: COMMERCIAL

## 2022-01-24 DIAGNOSIS — M25.562 ACUTE PAIN OF LEFT KNEE: ICD-10-CM

## 2022-01-24 PROCEDURE — 73562 X-RAY EXAM OF KNEE 3: CPT

## 2022-03-17 RX ORDER — OMEPRAZOLE 20 MG/1
CAPSULE, DELAYED RELEASE ORAL
Qty: 30 CAPSULE | Refills: 2 | Status: SHIPPED | OUTPATIENT
Start: 2022-03-17

## 2022-03-17 NOTE — TELEPHONE ENCOUNTER
Medication:   Requested Prescriptions     Pending Prescriptions Disp Refills    omeprazole (PRILOSEC) 20 MG delayed release capsule [Pharmacy Med Name: omeprazole 20 mg capsule,delayed release] 30 capsule 0     Sig: TAKE 1 CAPSULE BY MOUTH EVERY MORNING BEFORE BREAKFAST        Last Filled:      Patient Phone Number: [phone] (home)     Last appt: 1/21/2022   Next appt: Visit date not found    Last OARRS: No flowsheet data found.

## 2022-03-21 DIAGNOSIS — E55.9 VITAMIN D DEFICIENCY: ICD-10-CM

## 2022-03-21 DIAGNOSIS — E78.5 HYPERLIPIDEMIA, UNSPECIFIED HYPERLIPIDEMIA TYPE: ICD-10-CM

## 2022-03-21 DIAGNOSIS — I10 ESSENTIAL HYPERTENSION: ICD-10-CM

## 2022-03-21 DIAGNOSIS — Z86.73 HISTORY OF STROKE: ICD-10-CM

## 2022-03-22 RX ORDER — ATORVASTATIN CALCIUM 40 MG/1
TABLET, FILM COATED ORAL
Qty: 90 TABLET | Refills: 0 | Status: SHIPPED | OUTPATIENT
Start: 2022-03-22 | End: 2022-06-27

## 2022-03-22 RX ORDER — ERGOCALCIFEROL 1.25 MG/1
CAPSULE ORAL
Qty: 12 CAPSULE | Refills: 0 | Status: SHIPPED | OUTPATIENT
Start: 2022-03-22 | End: 2022-06-27

## 2022-03-22 RX ORDER — METOPROLOL SUCCINATE 50 MG/1
TABLET, EXTENDED RELEASE ORAL
Qty: 90 TABLET | Refills: 0 | Status: SHIPPED | OUTPATIENT
Start: 2022-03-22 | End: 2022-06-27

## 2022-03-22 NOTE — TELEPHONE ENCOUNTER
Medication:   Requested Prescriptions     Pending Prescriptions Disp Refills    atorvastatin (LIPITOR) 40 MG tablet [Pharmacy Med Name: atorvastatin 40 mg tablet] 90 tablet 0     Sig: TAKE 1 TABLET BY MOUTH EVERY DAY    metoprolol succinate (TOPROL XL) 50 MG extended release tablet [Pharmacy Med Name: metoprolol succinate ER 50 mg tablet,extended release 24 hr] 90 tablet 0     Sig: TAKE 1 TABLET BY MOUTH EVERY DAY    vitamin D (ERGOCALCIFEROL) 1.25 MG (18820 UT) CAPS capsule [Pharmacy Med Name: ergocalciferol (vitamin D2) 1,250 mcg (50,000 unit) capsule] 12 capsule 0     Sig: TAKE 1 CAPSULE BY MOUTH EVERY WEEK        Last Filled:  12/28/2021    Patient Phone Number: [phone] (home)     Last appt: 1/21/2022   Next appt: Visit date not found    Last OARRS: No flowsheet data found.

## 2022-04-11 ENCOUNTER — OFFICE VISIT (OUTPATIENT)
Dept: FAMILY MEDICINE CLINIC | Age: 82
End: 2022-04-11
Payer: COMMERCIAL

## 2022-04-11 VITALS
OXYGEN SATURATION: 95 % | DIASTOLIC BLOOD PRESSURE: 68 MMHG | HEIGHT: 58 IN | HEART RATE: 65 BPM | TEMPERATURE: 97.6 F | WEIGHT: 107 LBS | BODY MASS INDEX: 22.46 KG/M2 | SYSTOLIC BLOOD PRESSURE: 110 MMHG

## 2022-04-11 DIAGNOSIS — R41.3 MEMORY PROBLEM: ICD-10-CM

## 2022-04-11 DIAGNOSIS — R63.4 WEIGHT LOSS: ICD-10-CM

## 2022-04-11 DIAGNOSIS — R41.0 CONFUSION: Primary | ICD-10-CM

## 2022-04-11 DIAGNOSIS — R41.0 CONFUSION: ICD-10-CM

## 2022-04-11 LAB
A/G RATIO: 1.6 (ref 1.1–2.2)
ALBUMIN SERPL-MCNC: 4.3 G/DL (ref 3.4–5)
ALP BLD-CCNC: 89 U/L (ref 40–129)
ALT SERPL-CCNC: 10 U/L (ref 10–40)
ANION GAP SERPL CALCULATED.3IONS-SCNC: 13 MMOL/L (ref 3–16)
AST SERPL-CCNC: 18 U/L (ref 15–37)
BASOPHILS ABSOLUTE: 0 K/UL (ref 0–0.2)
BASOPHILS RELATIVE PERCENT: 0.8 %
BILIRUB SERPL-MCNC: 0.4 MG/DL (ref 0–1)
BILIRUBIN, POC: NORMAL
BLOOD URINE, POC: NORMAL
BUN BLDV-MCNC: 25 MG/DL (ref 7–20)
CALCIUM SERPL-MCNC: 9.3 MG/DL (ref 8.3–10.6)
CHLORIDE BLD-SCNC: 102 MMOL/L (ref 99–110)
CLARITY, POC: NORMAL
CO2: 23 MMOL/L (ref 21–32)
COLOR, POC: NORMAL
CREAT SERPL-MCNC: 1.3 MG/DL (ref 0.6–1.2)
EOSINOPHILS ABSOLUTE: 0.2 K/UL (ref 0–0.6)
EOSINOPHILS RELATIVE PERCENT: 2.7 %
GFR AFRICAN AMERICAN: 47
GFR NON-AFRICAN AMERICAN: 39
GLUCOSE BLD-MCNC: 88 MG/DL (ref 70–99)
GLUCOSE URINE, POC: NEGATIVE
HCT VFR BLD CALC: 36.4 % (ref 36–48)
HEMOGLOBIN: 12.1 G/DL (ref 12–16)
KETONES, POC: NORMAL
LEUKOCYTE EST, POC: NORMAL
LYMPHOCYTES ABSOLUTE: 1.6 K/UL (ref 1–5.1)
LYMPHOCYTES RELATIVE PERCENT: 27 %
MCH RBC QN AUTO: 30 PG (ref 26–34)
MCHC RBC AUTO-ENTMCNC: 33.3 G/DL (ref 31–36)
MCV RBC AUTO: 90 FL (ref 80–100)
MONOCYTES ABSOLUTE: 0.6 K/UL (ref 0–1.3)
MONOCYTES RELATIVE PERCENT: 10.7 %
NEUTROPHILS ABSOLUTE: 3.5 K/UL (ref 1.7–7.7)
NEUTROPHILS RELATIVE PERCENT: 58.8 %
NITRITE, POC: NEGATIVE
PDW BLD-RTO: 13.2 % (ref 12.4–15.4)
PH, POC: 5.5
PLATELET # BLD: 257 K/UL (ref 135–450)
PMV BLD AUTO: 8.3 FL (ref 5–10.5)
POTASSIUM SERPL-SCNC: 4.8 MMOL/L (ref 3.5–5.1)
PROTEIN, POC: NEGATIVE
RBC # BLD: 4.04 M/UL (ref 4–5.2)
SODIUM BLD-SCNC: 138 MMOL/L (ref 136–145)
SPECIFIC GRAVITY, POC: 1.01
T4 FREE: 1.6 NG/DL (ref 0.9–1.8)
TOTAL PROTEIN: 7 G/DL (ref 6.4–8.2)
TSH SERPL DL<=0.05 MIU/L-ACNC: 2.35 UIU/ML (ref 0.27–4.2)
UROBILINOGEN, POC: NORMAL
VITAMIN B-12: 436 PG/ML (ref 211–911)
WBC # BLD: 5.9 K/UL (ref 4–11)

## 2022-04-11 PROCEDURE — G8420 CALC BMI NORM PARAMETERS: HCPCS | Performed by: NURSE PRACTITIONER

## 2022-04-11 PROCEDURE — 81003 URINALYSIS AUTO W/O SCOPE: CPT | Performed by: NURSE PRACTITIONER

## 2022-04-11 PROCEDURE — 1090F PRES/ABSN URINE INCON ASSESS: CPT | Performed by: NURSE PRACTITIONER

## 2022-04-11 PROCEDURE — G8427 DOCREV CUR MEDS BY ELIG CLIN: HCPCS | Performed by: NURSE PRACTITIONER

## 2022-04-11 PROCEDURE — 1123F ACP DISCUSS/DSCN MKR DOCD: CPT | Performed by: NURSE PRACTITIONER

## 2022-04-11 PROCEDURE — 99213 OFFICE O/P EST LOW 20 MIN: CPT | Performed by: NURSE PRACTITIONER

## 2022-04-11 PROCEDURE — 1036F TOBACCO NON-USER: CPT | Performed by: NURSE PRACTITIONER

## 2022-04-11 PROCEDURE — 4040F PNEUMOC VAC/ADMIN/RCVD: CPT | Performed by: NURSE PRACTITIONER

## 2022-04-11 PROCEDURE — G8400 PT W/DXA NO RESULTS DOC: HCPCS | Performed by: NURSE PRACTITIONER
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
## 2022-04-12 ENCOUNTER — TELEPHONE (OUTPATIENT)
Dept: FAMILY MEDICINE CLINIC | Age: 82
End: 2022-04-12

## 2022-04-12 DIAGNOSIS — R94.4 DECREASED GFR: Primary | ICD-10-CM

## 2022-04-12 DIAGNOSIS — R41.3 MEMORY PROBLEM: ICD-10-CM

## 2022-04-12 LAB — URINE CULTURE, ROUTINE: NORMAL

## 2022-04-12 NOTE — TELEPHONE ENCOUNTER
Patient's daughter is calling in to see if Leward Jenny can result on the blood work that the patient had completed yesterday. Please advise patient's daughter.

## 2022-04-25 ENCOUNTER — TELEPHONE (OUTPATIENT)
Dept: FAMILY MEDICINE CLINIC | Age: 82
End: 2022-04-25
Payer: COMMERCIAL

## 2022-04-25 DIAGNOSIS — B37.9 YEAST INFECTION: ICD-10-CM

## 2022-04-25 DIAGNOSIS — M25.562 ACUTE PAIN OF LEFT KNEE: ICD-10-CM

## 2022-04-25 DIAGNOSIS — R05.9 COUGH: Primary | ICD-10-CM

## 2022-04-25 PROCEDURE — 99441 PR PHYS/QHP TELEPHONE EVALUATION 5-10 MIN: CPT | Performed by: NURSE PRACTITIONER

## 2022-04-25 RX ORDER — AZITHROMYCIN 250 MG/1
250 TABLET, FILM COATED ORAL SEE ADMIN INSTRUCTIONS
Qty: 6 TABLET | Refills: 0 | Status: SHIPPED | OUTPATIENT
Start: 2022-04-25 | End: 2022-04-30

## 2022-04-25 RX ORDER — FLUCONAZOLE 150 MG/1
150 TABLET ORAL EVERY OTHER DAY
Qty: 3 TABLET | Refills: 0 | Status: SHIPPED | OUTPATIENT
Start: 2022-04-25 | End: 2022-05-01

## 2022-04-25 NOTE — TELEPHONE ENCOUNTER
Patient's daughter called asking for Diflucan and Z-Fabian. Upon reviewing documentation from hospital stay unsure what the Diflucan a Z-Fabian are for. Appears she was discharged with a neurology referral set up for her confusion. 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 No reason identified for acute mental change per me reviewing the note. Hospital stay:    Daughter took her in Friday morning. States zero care available for consult per pt daughter. No room for pt. Did not have the staff. Upon going to bed Friday evening when the daughter returned Saturday morning patient had significant left knee pain. Unsure of fall but nothing documented per daughter. Ortho consult placed but they never did come in. Ortho referral was placed on discharge. We will try to get patient seen by Ortho sooner rather than later. 30 morning she was not weightbearing and was sent home with a walker. Patient requesting Voltaren cream    Cognitive wise she did get an MRI that showed a past infarct. And referral for neurology outpatient follow-up was placed. Daughter did ask if we had a neurologist that spoke Jay[place] to help with a language barrier. I did check with our  and we do not have a neurologist that speaks Margaret Gallegos. They did recheck her urine in the hospital but daughter states that sat on the table for a few days before they ran it. Cough: Patient daughter stated cough started when they got home from the hospital.  Worse throughout night. Denies green sputum. Denies fever and body aches. Advised patient's daughter on keeping a close eye on her mom for signs and symptoms of pneumonia. These are fever, chills, body aches, decrease in appetite, decrease in fluids. Patient's daughter will make sure mother is drinking adequately and started on antibiotics. Patient's daughter unsure if she would you be able to get her into the office but if any worse I will order chest x-ray and patient's daughter agreed.     Ortho: We will order Voltaren gel and reach out to Ortho in the office find us to see if we get her in stat. Scheduled appt 4/27/22 9:45 AM.     1. Cough    - azithromycin (ZITHROMAX) 250 MG tablet; Take 1 tablet by mouth See Admin Instructions for 5 days 500mg on day 1 followed by 250mg on days 2 - 5  Dispense: 6 tablet; Refill: 0    2. Acute pain of left knee    - diclofenac sodium (VOLTAREN) 1 % GEL; Apply 2 g topically 4 times daily as needed for Pain (left knee pain)  Dispense: 56 g; Refill: 0    3. Yeast infection    - fluconazole (DIFLUCAN) 150 MG tablet; Take 1 tablet by mouth every other day for 6 days  Dispense: 3 tablet;  Refill: 0

## 2022-04-28 ENCOUNTER — TELEPHONE (OUTPATIENT)
Dept: FAMILY MEDICINE CLINIC | Age: 82
End: 2022-04-28

## 2022-04-28 NOTE — TELEPHONE ENCOUNTER
Spoke with patients daughter and her  has a name of a Neurologist they would like a referral to. Pt's daughter will message on mychart and I will place referral.. Pt knee a little better but not as good as she was prior to hospital stay per daughter. PT coming to Pt home currently.

## 2022-06-16 ENCOUNTER — TELEMEDICINE (OUTPATIENT)
Dept: FAMILY MEDICINE CLINIC | Age: 82
End: 2022-06-16
Payer: COMMERCIAL

## 2022-06-16 DIAGNOSIS — U07.1 COVID: Primary | ICD-10-CM

## 2022-06-16 PROCEDURE — 36415 COLL VENOUS BLD VENIPUNCTURE: CPT | Performed by: NURSE PRACTITIONER

## 2022-06-16 PROCEDURE — 99213 OFFICE O/P EST LOW 20 MIN: CPT | Performed by: NURSE PRACTITIONER

## 2022-06-16 PROCEDURE — 1036F TOBACCO NON-USER: CPT | Performed by: NURSE PRACTITIONER

## 2022-06-16 PROCEDURE — G8420 CALC BMI NORM PARAMETERS: HCPCS | Performed by: NURSE PRACTITIONER

## 2022-06-16 PROCEDURE — G8400 PT W/DXA NO RESULTS DOC: HCPCS | Performed by: NURSE PRACTITIONER

## 2022-06-16 PROCEDURE — G8427 DOCREV CUR MEDS BY ELIG CLIN: HCPCS | Performed by: NURSE PRACTITIONER

## 2022-06-16 PROCEDURE — 1123F ACP DISCUSS/DSCN MKR DOCD: CPT | Performed by: NURSE PRACTITIONER

## 2022-06-16 PROCEDURE — 1090F PRES/ABSN URINE INCON ASSESS: CPT | Performed by: NURSE PRACTITIONER

## 2022-06-16 RX ORDER — BENZONATATE 100 MG/1
100 CAPSULE ORAL 3 TIMES DAILY PRN
Qty: 30 CAPSULE | Refills: 0 | Status: SHIPPED | OUTPATIENT
Start: 2022-06-16 | End: 2022-06-17 | Stop reason: SDUPTHER

## 2022-06-16 SDOH — ECONOMIC STABILITY: FOOD INSECURITY: WITHIN THE PAST 12 MONTHS, YOU WORRIED THAT YOUR FOOD WOULD RUN OUT BEFORE YOU GOT MONEY TO BUY MORE.: NEVER TRUE

## 2022-06-16 SDOH — ECONOMIC STABILITY: FOOD INSECURITY: WITHIN THE PAST 12 MONTHS, THE FOOD YOU BOUGHT JUST DIDN'T LAST AND YOU DIDN'T HAVE MONEY TO GET MORE.: NEVER TRUE

## 2022-06-16 ASSESSMENT — PATIENT HEALTH QUESTIONNAIRE - PHQ9
SUM OF ALL RESPONSES TO PHQ QUESTIONS 1-9: 0
SUM OF ALL RESPONSES TO PHQ9 QUESTIONS 1 & 2: 0
SUM OF ALL RESPONSES TO PHQ QUESTIONS 1-9: 0
1. LITTLE INTEREST OR PLEASURE IN DOING THINGS: 0
SUM OF ALL RESPONSES TO PHQ QUESTIONS 1-9: 0
SUM OF ALL RESPONSES TO PHQ QUESTIONS 1-9: 0
2. FEELING DOWN, DEPRESSED OR HOPELESS: 0

## 2022-06-16 ASSESSMENT — SOCIAL DETERMINANTS OF HEALTH (SDOH): HOW HARD IS IT FOR YOU TO PAY FOR THE VERY BASICS LIKE FOOD, HOUSING, MEDICAL CARE, AND HEATING?: NOT HARD AT ALL

## 2022-06-16 NOTE — PROGRESS NOTES
Denise Hoff (:  1940) is a Established patient, here for evaluation of the following:    Assessment & Plan   Below is the assessment and plan developed based on review of pertinent history, physical exam, labs, studies, and medications. 1. COVID  Stable; Not progressing;  Home COVID positive 2 days ago. Will get formal COVID test and BMP since family interested in paxlovid. Begin tessalon perles. Discussed warm salt water gargles, warm tea with honey and plenty of fluids. Family to monitor patient closely. -     COVID-19; Future  -     Basic Metabolic Panel  -     benzonatate (TESSALON) 100 MG capsule; Take 1 capsule by mouth 3 times daily as needed for Cough, Disp-30 capsule, R-0Normal    Return if symptoms worsen or fail to improve. Subjective   HPI   Home positive 2 days ago  Started with symptoms 2 days ago  Thinks was exposed on   Cough  No chest pain or chest tightening  Dizziness- yesterday; was low  Congestion, runny nose  No fevers or chills  + drinking water- daughter is making her drink  + eating  + urinating  Gave nyquil x 1  Review of Systems       Objective   Patient-Reported Vitals  Patient-Reported Weight: 108  Patient-Reported Height: 4'11\"       Physical Exam  Constitutional:       General: She is not in acute distress. Appearance: Normal appearance. She is not ill-appearing. HENT:      Head: Normocephalic. Right Ear: External ear normal.      Left Ear: External ear normal.      Nose: Nose normal.   Pulmonary:      Effort: No respiratory distress. Neurological:      Mental Status: She is alert. Psychiatric:         Mood and Affect: Mood normal.         Denise Hoff, was evaluated through a synchronous (real-time) audio-video encounter. The patient (or guardian if applicable) is aware that this is a billable service, which includes applicable co-pays. This Virtual Visit was conducted with patient's (and/or legal guardian's) consent.  The visit was conducted pursuant to the emergency declaration under the [facility], [facility] authority and the [employer] General Act. Patient identification was verified, and a caregiver was present when appropriate. The patient was located at Home: [address] [place] [address]. Provider was located at [facility] (Appt Dept): [address] [place] [address].         --BLAISE Gibbs - CNP

## 2022-06-17 ENCOUNTER — TELEPHONE (OUTPATIENT)
Dept: FAMILY MEDICINE CLINIC | Age: 82
End: 2022-06-17

## 2022-06-17 DIAGNOSIS — E87.1 HYPONATREMIA: Primary | ICD-10-CM

## 2022-06-17 DIAGNOSIS — U07.1 COVID: ICD-10-CM

## 2022-06-17 LAB
ANION GAP SERPL CALCULATED.3IONS-SCNC: 14 MMOL/L (ref 3–16)
BUN BLDV-MCNC: 14 MG/DL (ref 7–20)
CALCIUM SERPL-MCNC: 8.5 MG/DL (ref 8.3–10.6)
CHLORIDE BLD-SCNC: 89 MMOL/L (ref 99–110)
CO2: 21 MMOL/L (ref 21–32)
CREAT SERPL-MCNC: 0.9 MG/DL (ref 0.6–1.2)
GFR AFRICAN AMERICAN: >60
GFR NON-AFRICAN AMERICAN: 60
GLUCOSE BLD-MCNC: 82 MG/DL (ref 70–99)
POTASSIUM SERPL-SCNC: 3.9 MMOL/L (ref 3.5–5.1)
SARS-COV-2: DETECTED
SODIUM BLD-SCNC: 124 MMOL/L (ref 136–145)

## 2022-06-17 RX ORDER — BENZONATATE 100 MG/1
100 CAPSULE ORAL 3 TIMES DAILY PRN
Qty: 30 CAPSULE | Refills: 0 | Status: SHIPPED | OUTPATIENT
Start: 2022-06-17 | End: 2022-06-24

## 2022-06-17 NOTE — TELEPHONE ENCOUNTER
Spoke with daughter Daya German. Discussed COVID positive and will send Paxlovid since kidney function has improved. Discussed sodium is low. Patient does report dizziness, gagging, but no confusion or headaches. Daughter will take patient to recheck today. Discussed could be dilutional since patient has been drinking a lot of water- can try liquid IV instead. ER precautions discussed.

## 2022-06-18 LAB
AMBIGUOUS ABBREVIATION: NORMAL
BUN / CREAT RATIO: 16 (ref 12–28)
BUN BLDV-MCNC: 19 MG/DL (ref 8–27)
CALCIUM SERPL-MCNC: 9 MG/DL (ref 8.7–10.3)
CHLORIDE BLD-SCNC: 98 MMOL/L (ref 96–106)
CO2: 23 MMOL/L (ref 20–29)
CREAT SERPL-MCNC: 1.2 MG/DL (ref 0.57–1)
ESTIMATED GLOMERULAR FILTRATION RATE CREATININE EQUATION: 45 ML/MIN/1.73
GLUCOSE BLD-MCNC: 115 MG/DL (ref 65–99)
POTASSIUM SERPL-SCNC: 5 MMOL/L (ref 3.5–5.2)
SODIUM BLD-SCNC: 134 MMOL/L (ref 134–144)

## 2022-06-27 DIAGNOSIS — I10 ESSENTIAL HYPERTENSION: ICD-10-CM

## 2022-06-27 DIAGNOSIS — E78.5 HYPERLIPIDEMIA, UNSPECIFIED HYPERLIPIDEMIA TYPE: ICD-10-CM

## 2022-06-27 DIAGNOSIS — E55.9 VITAMIN D DEFICIENCY: ICD-10-CM

## 2022-06-27 DIAGNOSIS — Z86.73 HISTORY OF STROKE: ICD-10-CM

## 2022-06-27 RX ORDER — ERGOCALCIFEROL 1.25 MG/1
CAPSULE ORAL
Qty: 12 CAPSULE | Refills: 0 | Status: SHIPPED | OUTPATIENT
Start: 2022-06-27

## 2022-06-27 RX ORDER — ATORVASTATIN CALCIUM 40 MG/1
TABLET, FILM COATED ORAL
Qty: 90 TABLET | Refills: 0 | Status: SHIPPED | OUTPATIENT
Start: 2022-06-27

## 2022-06-27 RX ORDER — METOPROLOL SUCCINATE 50 MG/1
TABLET, EXTENDED RELEASE ORAL
Qty: 90 TABLET | Refills: 0 | Status: SHIPPED | OUTPATIENT
Start: 2022-06-27

## 2022-06-27 NOTE — TELEPHONE ENCOUNTER
Medication:   Requested Prescriptions     Pending Prescriptions Disp Refills    vitamin D (ERGOCALCIFEROL) 1.25 MG (18714 UT) CAPS capsule [Pharmacy Med Name: ergocalciferol (vitamin D2) 1,250 mcg (50,000 unit) capsule] 12 capsule 0     Sig: TAKE 1 CAPSULE BY MOUTH EVERY WEEK    metoprolol succinate (TOPROL XL) 50 MG extended release tablet [Pharmacy Med Name: metoprolol succinate ER 50 mg tablet,extended release 24 hr] 90 tablet 0     Sig: TAKE 1 TABLET BY MOUTH EVERY DAY    atorvastatin (LIPITOR) 40 MG tablet [Pharmacy Med Name: atorvastatin 40 mg tablet] 90 tablet 0     Sig: TAKE 1 TABLET BY MOUTH EVERY DAY       Last Filled:      Patient Phone Number: [phone] (home)     Last appt: 6/16/2022   Next appt: Visit date not found    Last Labs Vit D: 3/5/20  Lab Results   Component Value Date    VITD25 49.2 03/05/2020    VITD25 25.8 03/14/2019         Medication:   Requested Prescriptions     Pending Prescriptions Disp Refills    vitamin D (ERGOCALCIFEROL) 1.25 MG (98670 UT) CAPS capsule [Pharmacy Med Name: ergocalciferol (vitamin D2) 1,250 mcg (50,000 unit) capsule] 12 capsule 0     Sig: TAKE 1 CAPSULE BY MOUTH EVERY WEEK    metoprolol succinate (TOPROL XL) 50 MG extended release tablet [Pharmacy Med Name: metoprolol succinate ER 50 mg tablet,extended release 24 hr] 90 tablet 0     Sig: TAKE 1 TABLET BY MOUTH EVERY DAY    atorvastatin (LIPITOR) 40 MG tablet [Pharmacy Med Name: atorvastatin 40 mg tablet] 90 tablet 0     Sig: TAKE 1 TABLET BY MOUTH EVERY DAY       Last Filled:      Patient Phone Number: [phone] (home)     Last appt: 6/16/2022   Next appt: Visit date not found    Last Lipid: 3/5/20  Lab Results   Component Value Date    CHOL 139 03/05/2020    TRIG 83 03/05/2020    HDL 67 03/05/2020    LDLCALC 55 03/05/2020

## 2022-09-23 DIAGNOSIS — M25.562 ACUTE PAIN OF LEFT KNEE: ICD-10-CM

## 2022-10-07 ENCOUNTER — TELEPHONE (OUTPATIENT)
Dept: FAMILY MEDICINE CLINIC | Age: 82
End: 2022-10-07

## 2022-10-07 DIAGNOSIS — R41.0 CONFUSION: Primary | ICD-10-CM

## 2022-10-07 NOTE — TELEPHONE ENCOUNTER
Patient's daughter is calling in asking if her mother should come by the office to do a urine sample. She states yesterday afternoon/evening the patient started acting very confused out of no where. The patient's daughter is wondering if she may have a UTI. But the patient's daughter would like to know if they should rule out any other options. Please advise.

## 2022-10-07 NOTE — TELEPHONE ENCOUNTER
Patient's daughter called back refusing to take the patient anywhere. They do not want to take her to the ER because last time she was taken in on a Friday she was not seen until Monday. She also had a fall at the hospital and it was not a good experience. The patient's daughter is still asking a UA and blood work be put in. She states her  is a physician and can read the results over the weekend and than follow up if needed. If this request can be taken care of the patient's daughter would like the labs to be sent to. She would also like a call when these have been placed.   [facility]-  [address] [facility]  Fax: [phone]

## 2022-10-07 NOTE — TELEPHONE ENCOUNTER
I called the patient's daughter and made her aware that the labs were placed and faxed to the [address] [place] [address] location she requested. I also provided her with the fax number for them to send the results too.

## 2022-10-07 NOTE — TELEPHONE ENCOUNTER
I ordered. Pls make sure they DO NOT call the on call provider for antibiotics. If indicated I will order but may not get the results from lab steph[facility] right away. The urine will most likely show some bacteria and white blood cells. That is common at this age.   If the urinalysis is bad enough OR the culture shows > 100,000 CFU then will send something in  If her symptoms worsen pls make sure they take her to the ER for more urgent care

## 2022-10-14 LAB
BILIRUBIN URINE: NORMAL
BLOOD, URINE: NORMAL
CLARITY: NORMAL
COLOR: NORMAL
CULTURE: ABNORMAL
CULTURE: ABNORMAL
GLUCOSE URINE: NORMAL
KETONES, URINE: NORMAL
LEUKOCYTE ESTERASE, URINE: NORMAL
MICROSCOPIC EXAMINATION: NORMAL
NITRITE, URINE: NORMAL
PH UA: NORMAL
PROTEIN UA: NORMAL
REQUEST PROBLEM: NORMAL
SPECIFIC GRAVITY UA: NORMAL
UROBILINOGEN, URINE: NORMAL

## 2022-12-06 DIAGNOSIS — E55.9 VITAMIN D DEFICIENCY: ICD-10-CM

## 2022-12-06 DIAGNOSIS — E78.5 HYPERLIPIDEMIA, UNSPECIFIED HYPERLIPIDEMIA TYPE: ICD-10-CM

## 2022-12-06 DIAGNOSIS — I10 ESSENTIAL HYPERTENSION: ICD-10-CM

## 2022-12-06 DIAGNOSIS — M25.562 ACUTE PAIN OF LEFT KNEE: ICD-10-CM

## 2022-12-06 DIAGNOSIS — Z86.73 HISTORY OF STROKE: ICD-10-CM

## 2022-12-07 RX ORDER — METOPROLOL SUCCINATE 50 MG/1
TABLET, EXTENDED RELEASE ORAL
Qty: 90 TABLET | Refills: 0 | Status: SHIPPED | OUTPATIENT
Start: 2022-12-07

## 2022-12-07 RX ORDER — ATORVASTATIN CALCIUM 40 MG/1
TABLET, FILM COATED ORAL
Qty: 90 TABLET | Refills: 0 | Status: SHIPPED | OUTPATIENT
Start: 2022-12-07

## 2022-12-07 RX ORDER — ERGOCALCIFEROL 1.25 MG/1
CAPSULE ORAL
Qty: 12 CAPSULE | Refills: 0 | Status: SHIPPED | OUTPATIENT
Start: 2022-12-07

## 2022-12-07 NOTE — TELEPHONE ENCOUNTER
Medication:   Requested Prescriptions     Pending Prescriptions Disp Refills    diclofenac sodium (VOLTAREN) 1 % GEL [Pharmacy Med Name: diclofenac 1 % topical gel] 100 g 0     Sig: apply 2 grams to left knee FOUR TIMES DAILY AS NEEDED pain        Last Filled:  9/26/22    Patient Phone Number: [phone] (home)     Last appt: 6/16/2022   Next appt: Visit date not found    Last OARRS: No flowsheet data found.

## 2022-12-07 NOTE — TELEPHONE ENCOUNTER
Medication:   Requested Prescriptions     Pending Prescriptions Disp Refills    vitamin D (ERGOCALCIFEROL) 1.25 MG (23789 UT) CAPS capsule [Pharmacy Med Name: ergocalciferol (vitamin D2) 1,250 mcg (50,000 unit) capsule] 12 capsule 0     Sig: TAKE 1 CAPSULE BY MOUTH EVERY WEEK    atorvastatin (LIPITOR) 40 MG tablet [Pharmacy Med Name: atorvastatin 40 mg tablet] 90 tablet 0     Sig: TAKE 1 TABLET BY MOUTH EVERY DAY    metoprolol succinate (TOPROL XL) 50 MG extended release tablet [Pharmacy Med Name: metoprolol succinate ER 50 mg tablet,extended release 24 hr] 90 tablet 0     Sig: TAKE 1 TABLET BY MOUTH EVERY DAY        Last Filled:  6/27/22    Patient Phone Number: [phone] (home)     Last appt: 6/16/2022   Next appt: 12/6/2022    Last OARRS: No flowsheet data found.

## 2022-12-16 ENCOUNTER — OFFICE VISIT (OUTPATIENT)
Dept: FAMILY MEDICINE CLINIC | Age: 82
End: 2022-12-16
Payer: COMMERCIAL

## 2022-12-16 VITALS
BODY MASS INDEX: 23.51 KG/M2 | DIASTOLIC BLOOD PRESSURE: 68 MMHG | HEIGHT: 58 IN | HEART RATE: 60 BPM | TEMPERATURE: 97.9 F | SYSTOLIC BLOOD PRESSURE: 118 MMHG | WEIGHT: 112 LBS | RESPIRATION RATE: 14 BRPM

## 2022-12-16 DIAGNOSIS — R53.83 FATIGUE, UNSPECIFIED TYPE: Primary | ICD-10-CM

## 2022-12-16 DIAGNOSIS — Z90.12 HISTORY OF MASTECTOMY, LEFT: ICD-10-CM

## 2022-12-16 DIAGNOSIS — E78.5 HYPERLIPIDEMIA, UNSPECIFIED HYPERLIPIDEMIA TYPE: ICD-10-CM

## 2022-12-16 DIAGNOSIS — I10 ESSENTIAL HYPERTENSION: ICD-10-CM

## 2022-12-16 DIAGNOSIS — M25.562 LEFT KNEE PAIN, UNSPECIFIED CHRONICITY: ICD-10-CM

## 2022-12-16 DIAGNOSIS — E55.9 VITAMIN D DEFICIENCY: ICD-10-CM

## 2022-12-16 PROCEDURE — G8484 FLU IMMUNIZE NO ADMIN: HCPCS | Performed by: NURSE PRACTITIONER

## 2022-12-16 PROCEDURE — G8427 DOCREV CUR MEDS BY ELIG CLIN: HCPCS | Performed by: NURSE PRACTITIONER

## 2022-12-16 PROCEDURE — G8400 PT W/DXA NO RESULTS DOC: HCPCS | Performed by: NURSE PRACTITIONER

## 2022-12-16 PROCEDURE — 3078F DIAST BP <80 MM HG: CPT | Performed by: NURSE PRACTITIONER

## 2022-12-16 PROCEDURE — 1036F TOBACCO NON-USER: CPT | Performed by: NURSE PRACTITIONER

## 2022-12-16 PROCEDURE — 99214 OFFICE O/P EST MOD 30 MIN: CPT | Performed by: NURSE PRACTITIONER

## 2022-12-16 PROCEDURE — 3074F SYST BP LT 130 MM HG: CPT | Performed by: NURSE PRACTITIONER

## 2022-12-16 PROCEDURE — G8420 CALC BMI NORM PARAMETERS: HCPCS | Performed by: NURSE PRACTITIONER

## 2022-12-16 PROCEDURE — 1090F PRES/ABSN URINE INCON ASSESS: CPT | Performed by: NURSE PRACTITIONER

## 2022-12-16 PROCEDURE — 1123F ACP DISCUSS/DSCN MKR DOCD: CPT | Performed by: NURSE PRACTITIONER

## 2022-12-16 NOTE — PROGRESS NOTES
Valerie Amaro (:  1940) is a 78 y.o. female,Established patient, here for evaluation of the following chief complaint(s):  Follow-up       ASSESSMENT/PLAN:  1. Fatigue, unspecified type  Stable; Not progressing;  Per family not related to hx of COVID. Labs ordered. -     TSH; Future  -     CBC; Future  -     Vitamin B12; Future  -     Vitamin D 25 Hydroxy; Future  2. Left knee pain, unspecified chronicity  Stable; Not progressing;  Voltaren gel reordered. Referral to ortho for possible injection. -     diclofenac sodium (VOLTAREN) 1 % GEL; Apply 2 g topically 4 times daily as needed for Pain, Topical, 4 TIMES DAILY PRN Starting 2022, Disp-100 g, R-0, Normal  -     External Referral To Orthopedic Surgery  3. Hyperlipidemia, unspecified hyperlipidemia type  Stable;  Patient doing well on atorvastatin. Recheck labs. -     Lipid Panel; Future  -     Comprehensive Metabolic Panel; Future  4. Essential hypertension  Stable;  BP good at visit. Continue metoprolol. Labs ordered. -     Comprehensive Metabolic Panel; Future  5. Vitamin D deficiency  Stable;  Recheck. -     Vitamin D 25 Hydroxy; Future  6. History of mastectomy, left  Stable;  Reordered mastectomy bra.  -     Misc. Devices MISC; Disp-1 each, R-0, PrintPlease provide two mastectomy bras of patient's choice. Dx: s/p left breast mastectomy. VERITO: lifelong. Return in about 6 months (around 2023). Subjective   SUBJECTIVE/OBJECTIVE:  HPI  Feeling more tired  X 3 months- does not think related to COVID    Left knee pain  Dx with arthritis  Bending and sitting down to get up has pain  Ointment helps  Wants to see ortho    HLD  Is taking atorvastatin  No myaglias    HYPERTENSION  BP good at visit  Is taking metoprolol    Review of Systems       Objective   Physical Exam  Vitals reviewed. Constitutional:       Appearance: Normal appearance. HENT:      Head: Normocephalic.       Right Ear: External ear normal.      Left Ear: External ear normal.   Cardiovascular:      Rate and Rhythm: Normal rate and regular rhythm. Pulses: Normal pulses. Heart sounds: Normal heart sounds, S1 normal and S2 normal.   Pulmonary:      Effort: Pulmonary effort is normal.      Breath sounds: Normal breath sounds and air entry. Musculoskeletal:      Right lower leg: No edema. Left lower leg: Tenderness present. No edema. Neurological:      Mental Status: She is alert. Psychiatric:         Mood and Affect: Mood normal.     An electronic signature was used to authenticate this note.     --BLAISE Butler - CNP

## 2022-12-19 DIAGNOSIS — R53.83 FATIGUE, UNSPECIFIED TYPE: ICD-10-CM

## 2022-12-19 DIAGNOSIS — I10 ESSENTIAL HYPERTENSION: ICD-10-CM

## 2022-12-19 DIAGNOSIS — E55.9 VITAMIN D DEFICIENCY: ICD-10-CM

## 2022-12-19 DIAGNOSIS — E78.5 HYPERLIPIDEMIA, UNSPECIFIED HYPERLIPIDEMIA TYPE: ICD-10-CM

## 2022-12-19 LAB
A/G RATIO: 1.4 (ref 1.1–2.2)
ALBUMIN SERPL-MCNC: 3.8 G/DL (ref 3.4–5)
ALP BLD-CCNC: 86 U/L (ref 40–129)
ALT SERPL-CCNC: 13 U/L (ref 10–40)
ANION GAP SERPL CALCULATED.3IONS-SCNC: 11 MMOL/L (ref 3–16)
AST SERPL-CCNC: 20 U/L (ref 15–37)
BILIRUB SERPL-MCNC: 0.4 MG/DL (ref 0–1)
BUN BLDV-MCNC: 29 MG/DL (ref 7–20)
CALCIUM SERPL-MCNC: 9.1 MG/DL (ref 8.3–10.6)
CHLORIDE BLD-SCNC: 100 MMOL/L (ref 99–110)
CHOLESTEROL, TOTAL: 137 MG/DL (ref 0–199)
CO2: 24 MMOL/L (ref 21–32)
CREAT SERPL-MCNC: 1.1 MG/DL (ref 0.6–1.2)
GFR SERPL CREATININE-BSD FRML MDRD: 49 ML/MIN/{1.73_M2}
GLUCOSE BLD-MCNC: 89 MG/DL (ref 70–99)
HCT VFR BLD CALC: 37.4 % (ref 36–48)
HDLC SERPL-MCNC: 63 MG/DL (ref 40–60)
HEMOGLOBIN: 12.2 G/DL (ref 12–16)
LDL CHOLESTEROL CALCULATED: 62 MG/DL
MCH RBC QN AUTO: 29.3 PG (ref 26–34)
MCHC RBC AUTO-ENTMCNC: 32.7 G/DL (ref 31–36)
MCV RBC AUTO: 89.5 FL (ref 80–100)
PDW BLD-RTO: 13.5 % (ref 12.4–15.4)
PLATELET # BLD: 287 K/UL (ref 135–450)
PMV BLD AUTO: 8.6 FL (ref 5–10.5)
POTASSIUM SERPL-SCNC: 5.3 MMOL/L (ref 3.5–5.1)
RBC # BLD: 4.18 M/UL (ref 4–5.2)
SODIUM BLD-SCNC: 135 MMOL/L (ref 136–145)
TOTAL PROTEIN: 6.6 G/DL (ref 6.4–8.2)
TRIGL SERPL-MCNC: 59 MG/DL (ref 0–150)
TSH SERPL DL<=0.05 MIU/L-ACNC: 3.55 UIU/ML (ref 0.27–4.2)
VITAMIN B-12: 431 PG/ML (ref 211–911)
VITAMIN D 25-HYDROXY: 78.2 NG/ML
VLDLC SERPL CALC-MCNC: 12 MG/DL
WBC # BLD: 7.8 K/UL (ref 4–11)

## 2022-12-29 ENCOUNTER — TELEPHONE (OUTPATIENT)
Dept: FAMILY MEDICINE CLINIC | Age: 82
End: 2022-12-29

## 2022-12-29 NOTE — TELEPHONE ENCOUNTER
----- Message from BLAISE Walters CNP sent at 12/23/2022  7:29 AM EST -----  Please let patient know thyroid is ok. Kidney panel looks like patient might have been dehydrated- kidney function a little worse, potassium a little high and sodium slightly low. I'd like her to hydrate and have redrawn. She is not taking any NSAIDs correct? Liver, cholesterol, vitamin D, vitamin B12 and blood counts are ok.

## 2023-02-22 DIAGNOSIS — Z86.73 HISTORY OF STROKE: ICD-10-CM

## 2023-02-22 DIAGNOSIS — I10 ESSENTIAL HYPERTENSION: ICD-10-CM

## 2023-02-22 DIAGNOSIS — E55.9 VITAMIN D DEFICIENCY: ICD-10-CM

## 2023-02-22 DIAGNOSIS — E78.5 HYPERLIPIDEMIA, UNSPECIFIED HYPERLIPIDEMIA TYPE: ICD-10-CM

## 2023-02-22 DIAGNOSIS — M25.562 LEFT KNEE PAIN, UNSPECIFIED CHRONICITY: ICD-10-CM

## 2023-02-22 NOTE — TELEPHONE ENCOUNTER
Medication:   Requested Prescriptions     Pending Prescriptions Disp Refills    aspirin 325 MG tablet 90 tablet 0     Sig: Take 1 tablet by mouth daily    omeprazole (PRILOSEC) 20 MG delayed release capsule 30 capsule 2    vitamin D (ERGOCALCIFEROL) 1.25 MG (42598 UT) CAPS capsule 12 capsule 0    atorvastatin (LIPITOR) 40 MG tablet 90 tablet 0    metoprolol succinate (TOPROL XL) 50 MG extended release tablet 90 tablet 0    diclofenac sodium (VOLTAREN) 1 %  g 0     Sig: Apply 2 g topically 4 times daily as needed for Pain        Last appt: 12/16/2022   Next appt: none

## 2023-02-23 RX ORDER — ERGOCALCIFEROL 1.25 MG/1
50000 CAPSULE ORAL WEEKLY
Qty: 12 CAPSULE | Refills: 0 | Status: SHIPPED | OUTPATIENT
Start: 2023-02-23

## 2023-02-23 RX ORDER — METOPROLOL SUCCINATE 50 MG/1
50 TABLET, EXTENDED RELEASE ORAL DAILY
Qty: 90 TABLET | Refills: 0 | Status: SHIPPED | OUTPATIENT
Start: 2023-02-23

## 2023-02-23 RX ORDER — ATORVASTATIN CALCIUM 40 MG/1
40 TABLET, FILM COATED ORAL DAILY
Qty: 90 TABLET | Refills: 0 | Status: SHIPPED | OUTPATIENT
Start: 2023-02-23

## 2023-02-23 RX ORDER — METOPROLOL SUCCINATE 50 MG/1
TABLET, EXTENDED RELEASE ORAL
Qty: 90 TABLET | Refills: 0 | OUTPATIENT
Start: 2023-02-23

## 2023-02-23 RX ORDER — OMEPRAZOLE 20 MG/1
CAPSULE, DELAYED RELEASE ORAL
Qty: 30 CAPSULE | Refills: 2 | Status: SHIPPED | OUTPATIENT
Start: 2023-02-23

## 2023-02-23 RX ORDER — ERGOCALCIFEROL 1.25 MG/1
CAPSULE ORAL
Qty: 12 CAPSULE | Refills: 0 | OUTPATIENT
Start: 2023-02-23

## 2023-02-23 RX ORDER — ATORVASTATIN CALCIUM 40 MG/1
TABLET, FILM COATED ORAL
Qty: 90 TABLET | Refills: 0 | OUTPATIENT
Start: 2023-02-23

## 2023-02-23 RX ORDER — ASPIRIN 325 MG
325 TABLET ORAL DAILY
Qty: 90 TABLET | Refills: 0 | Status: SHIPPED | OUTPATIENT
Start: 2023-02-23

## 2023-05-20 DIAGNOSIS — I10 ESSENTIAL HYPERTENSION: ICD-10-CM

## 2023-05-20 DIAGNOSIS — Z86.73 HISTORY OF STROKE: ICD-10-CM

## 2023-05-20 DIAGNOSIS — E55.9 VITAMIN D DEFICIENCY: ICD-10-CM

## 2023-05-20 DIAGNOSIS — M25.562 LEFT KNEE PAIN, UNSPECIFIED CHRONICITY: ICD-10-CM

## 2023-05-20 DIAGNOSIS — E78.5 HYPERLIPIDEMIA, UNSPECIFIED HYPERLIPIDEMIA TYPE: ICD-10-CM

## 2023-05-22 RX ORDER — ATORVASTATIN CALCIUM 40 MG/1
40 TABLET, FILM COATED ORAL DAILY
Qty: 90 TABLET | Refills: 0 | Status: SHIPPED | OUTPATIENT
Start: 2023-05-22

## 2023-05-22 RX ORDER — ERGOCALCIFEROL 1.25 MG/1
50000 CAPSULE ORAL WEEKLY
Qty: 12 CAPSULE | Refills: 0 | Status: SHIPPED | OUTPATIENT
Start: 2023-05-22

## 2023-05-22 RX ORDER — ASPIRIN 325 MG
325 TABLET ORAL DAILY
Qty: 90 TABLET | Refills: 0 | Status: SHIPPED | OUTPATIENT
Start: 2023-05-22

## 2023-05-22 RX ORDER — METOPROLOL SUCCINATE 50 MG/1
50 TABLET, EXTENDED RELEASE ORAL DAILY
Qty: 90 TABLET | Refills: 0 | Status: SHIPPED | OUTPATIENT
Start: 2023-05-22

## 2023-05-23 DIAGNOSIS — E55.9 VITAMIN D DEFICIENCY: ICD-10-CM

## 2023-05-23 DIAGNOSIS — Z86.73 HISTORY OF STROKE: ICD-10-CM

## 2023-05-23 DIAGNOSIS — M25.562 LEFT KNEE PAIN, UNSPECIFIED CHRONICITY: ICD-10-CM

## 2023-05-23 DIAGNOSIS — E78.5 HYPERLIPIDEMIA, UNSPECIFIED HYPERLIPIDEMIA TYPE: ICD-10-CM

## 2023-05-23 DIAGNOSIS — I10 ESSENTIAL HYPERTENSION: ICD-10-CM

## 2023-05-23 RX ORDER — METOPROLOL SUCCINATE 50 MG/1
50 TABLET, EXTENDED RELEASE ORAL DAILY
Qty: 90 TABLET | Refills: 0 | OUTPATIENT
Start: 2023-05-23

## 2023-05-23 RX ORDER — ATORVASTATIN CALCIUM 40 MG/1
40 TABLET, FILM COATED ORAL DAILY
Qty: 90 TABLET | Refills: 0 | OUTPATIENT
Start: 2023-05-23

## 2023-05-23 RX ORDER — ERGOCALCIFEROL 1.25 MG/1
50000 CAPSULE ORAL WEEKLY
Qty: 12 CAPSULE | Refills: 0 | OUTPATIENT
Start: 2023-05-23

## 2023-05-23 RX ORDER — ASPIRIN 325 MG
325 TABLET ORAL DAILY
Qty: 90 TABLET | Refills: 0 | OUTPATIENT
Start: 2023-05-23

## 2023-08-01 DIAGNOSIS — M25.562 LEFT KNEE PAIN, UNSPECIFIED CHRONICITY: ICD-10-CM

## 2023-08-02 DIAGNOSIS — M25.562 LEFT KNEE PAIN, UNSPECIFIED CHRONICITY: ICD-10-CM

## 2023-08-03 NOTE — TELEPHONE ENCOUNTER
Patient's daughter is calling in to follow up on the status of this request. She would like to know if this can be sent in asap. Please advise.

## 2023-08-03 NOTE — TELEPHONE ENCOUNTER
Medication:   Requested Prescriptions     Pending Prescriptions Disp Refills    diclofenac sodium (VOLTAREN) 1 %  g 0        Last Filled:  5/22/23    Patient Phone Number: [phone] (home)     Last appt: 12/16/2022 - Return in about 6 months (around 6/16/2023). Next appt: 8/1/2023    Last OARRS: No flowsheet data found.

## 2023-08-30 DIAGNOSIS — E78.5 HYPERLIPIDEMIA, UNSPECIFIED HYPERLIPIDEMIA TYPE: ICD-10-CM

## 2023-08-30 DIAGNOSIS — E55.9 VITAMIN D DEFICIENCY: ICD-10-CM

## 2023-08-30 DIAGNOSIS — Z86.73 HISTORY OF STROKE: ICD-10-CM

## 2023-08-30 DIAGNOSIS — M25.562 LEFT KNEE PAIN, UNSPECIFIED CHRONICITY: ICD-10-CM

## 2023-08-30 DIAGNOSIS — I10 ESSENTIAL HYPERTENSION: ICD-10-CM

## 2023-08-30 DIAGNOSIS — Z90.12 HISTORY OF MASTECTOMY, LEFT: ICD-10-CM

## 2023-08-31 RX ORDER — ATORVASTATIN CALCIUM 40 MG/1
40 TABLET, FILM COATED ORAL DAILY
Qty: 90 TABLET | Refills: 0 | Status: SHIPPED | OUTPATIENT
Start: 2023-08-31

## 2023-08-31 RX ORDER — ERGOCALCIFEROL 1.25 MG/1
50000 CAPSULE ORAL WEEKLY
Qty: 12 CAPSULE | Refills: 0 | Status: SHIPPED | OUTPATIENT
Start: 2023-08-31

## 2023-08-31 RX ORDER — METOPROLOL SUCCINATE 50 MG/1
50 TABLET, EXTENDED RELEASE ORAL DAILY
Qty: 90 TABLET | Refills: 0 | Status: SHIPPED | OUTPATIENT
Start: 2023-08-31

## 2023-08-31 RX ORDER — ASPIRIN 325 MG
325 TABLET ORAL DAILY
Qty: 90 TABLET | Refills: 0 | Status: SHIPPED | OUTPATIENT
Start: 2023-08-31

## 2023-08-31 NOTE — TELEPHONE ENCOUNTER
Medication:   Requested Prescriptions     Pending Prescriptions Disp Refills    Misc. Devices MISC 1 each 0     Sig: Please provide two mastectomy bras of patient's choice. Dx: s/p left breast mastectomy. VERITO: lifelong. vitamin D (ERGOCALCIFEROL) 1.25 MG (19883 UT) CAPS capsule 12 capsule 0     Sig: Take 1 capsule by mouth once a week    atorvastatin (LIPITOR) 40 MG tablet 90 tablet 0     Sig: Take 1 tablet by mouth daily    aspirin 325 MG tablet 90 tablet 0     Sig: Take 1 tablet by mouth daily    metoprolol succinate (TOPROL XL) 50 MG extended release tablet 90 tablet 0     Sig: Take 1 tablet by mouth daily    diclofenac sodium (VOLTAREN) 1 %  g 0     Sig: Apply topically 4 times daily as needed for Pain       Patient Phone Number: [phone] (home)     Last appt: 12/16/2022   Next appt: Visit date not found    Last OARRS: No flowsheet data found.

## 2023-11-17 DIAGNOSIS — I10 ESSENTIAL HYPERTENSION: ICD-10-CM

## 2023-11-17 DIAGNOSIS — E78.5 HYPERLIPIDEMIA, UNSPECIFIED HYPERLIPIDEMIA TYPE: ICD-10-CM

## 2023-11-17 DIAGNOSIS — Z90.12 HISTORY OF MASTECTOMY, LEFT: ICD-10-CM

## 2023-11-17 DIAGNOSIS — M25.562 LEFT KNEE PAIN, UNSPECIFIED CHRONICITY: ICD-10-CM

## 2023-11-17 DIAGNOSIS — Z86.73 HISTORY OF STROKE: ICD-10-CM

## 2023-11-17 DIAGNOSIS — E55.9 VITAMIN D DEFICIENCY: ICD-10-CM

## 2023-11-17 ASSESSMENT — PATIENT HEALTH QUESTIONNAIRE - PHQ9
SUM OF ALL RESPONSES TO PHQ QUESTIONS 1-9: 0
SUM OF ALL RESPONSES TO PHQ9 QUESTIONS 1 & 2: 0
2. FEELING DOWN, DEPRESSED OR HOPELESS: NOT AT ALL
2. FEELING DOWN, DEPRESSED OR HOPELESS: 0
SUM OF ALL RESPONSES TO PHQ QUESTIONS 1-9: 0
1. LITTLE INTEREST OR PLEASURE IN DOING THINGS: 0
SUM OF ALL RESPONSES TO PHQ QUESTIONS 1-9: 0
SUM OF ALL RESPONSES TO PHQ QUESTIONS 1-9: 0
1. LITTLE INTEREST OR PLEASURE IN DOING THINGS: NOT AT ALL
SUM OF ALL RESPONSES TO PHQ9 QUESTIONS 1 & 2: 0

## 2023-11-20 ENCOUNTER — OFFICE VISIT (OUTPATIENT)
Dept: FAMILY MEDICINE CLINIC | Age: 83
End: 2023-11-20

## 2023-11-20 VITALS
WEIGHT: 111.2 LBS | DIASTOLIC BLOOD PRESSURE: 78 MMHG | TEMPERATURE: 97.7 F | OXYGEN SATURATION: 98 % | BODY MASS INDEX: 23.34 KG/M2 | HEART RATE: 82 BPM | HEIGHT: 58 IN | SYSTOLIC BLOOD PRESSURE: 132 MMHG

## 2023-11-20 DIAGNOSIS — E55.9 VITAMIN D DEFICIENCY: ICD-10-CM

## 2023-11-20 DIAGNOSIS — H35.30 MACULAR DEGENERATION, UNSPECIFIED LATERALITY, UNSPECIFIED TYPE: ICD-10-CM

## 2023-11-20 DIAGNOSIS — Z86.73 HISTORY OF STROKE: ICD-10-CM

## 2023-11-20 DIAGNOSIS — E78.5 HYPERLIPIDEMIA, UNSPECIFIED HYPERLIPIDEMIA TYPE: ICD-10-CM

## 2023-11-20 DIAGNOSIS — Z00.00 WELL ADULT EXAM: Primary | ICD-10-CM

## 2023-11-20 DIAGNOSIS — M25.561 PAIN IN BOTH KNEES, UNSPECIFIED CHRONICITY: ICD-10-CM

## 2023-11-20 DIAGNOSIS — Z13.0 SCREENING FOR DEFICIENCY ANEMIA: ICD-10-CM

## 2023-11-20 DIAGNOSIS — I10 ESSENTIAL HYPERTENSION: ICD-10-CM

## 2023-11-20 DIAGNOSIS — M25.562 PAIN IN BOTH KNEES, UNSPECIFIED CHRONICITY: ICD-10-CM

## 2023-11-20 DIAGNOSIS — Z78.0 POSTMENOPAUSAL: ICD-10-CM

## 2023-11-20 PROCEDURE — 3078F DIAST BP <80 MM HG: CPT | Performed by: NURSE PRACTITIONER

## 2023-11-20 PROCEDURE — 3074F SYST BP LT 130 MM HG: CPT | Performed by: NURSE PRACTITIONER

## 2023-11-20 PROCEDURE — 99397 PER PM REEVAL EST PAT 65+ YR: CPT | Performed by: NURSE PRACTITIONER

## 2023-11-20 RX ORDER — ATORVASTATIN CALCIUM 40 MG/1
40 TABLET, FILM COATED ORAL DAILY
Qty: 90 TABLET | Refills: 0 | Status: SHIPPED | OUTPATIENT
Start: 2023-11-20

## 2023-11-20 RX ORDER — ASPIRIN 325 MG
325 TABLET ORAL DAILY
Qty: 90 TABLET | Refills: 0 | Status: SHIPPED | OUTPATIENT
Start: 2023-11-20

## 2023-11-20 RX ORDER — ERGOCALCIFEROL 1.25 MG/1
50000 CAPSULE ORAL WEEKLY
Qty: 12 CAPSULE | Refills: 0 | Status: SHIPPED | OUTPATIENT
Start: 2023-11-20

## 2023-11-20 RX ORDER — METOPROLOL SUCCINATE 50 MG/1
50 TABLET, EXTENDED RELEASE ORAL DAILY
Qty: 90 TABLET | Refills: 0 | Status: SHIPPED | OUTPATIENT
Start: 2023-11-20

## 2023-11-20 NOTE — PROGRESS NOTES
[facility] 60 12/19/2022 09:56 AM    LDLCALC 55 03/05/2020 11:26 AM    LDLCALC 53 03/14/2019 11:56 AM    GLUCOSE 89 12/19/2022 09:56 AM       The ASCVD Risk score (Edinson HERNANDEZ, et al., 2019) failed to calculate for the following reasons: The 2019 ASCVD risk score is only valid for ages 36 to 78    Immunization History   Administered Date(s) Administered    COVID-19, PFIZER Bivalent, DO NOT Dilute, (age 12y+), IM, 30 mcg/0.3 mL 12/08/2022    COVID-19, PFIZER GRAY top, DO NOT Dilute, (age 15 y+), IM, 30 mcg/0.3 mL 05/24/2022    COVID-19, PFIZER PURPLE top, DILUTE for use, (age 15 y+), 30mcg/0.3mL 01/20/2021, 02/10/2021, 11/15/2021    Influenza, FLUARIX, FLULAVAL, FLUZONE (age 10 mo+) AND AFLURIA, (age 1 y+), PF, 0.5mL 11/27/2017    Pneumococcal, PCV-13, PREVNAR 13, (age 6w+), IM, 0.5mL 04/30/2018       Health Maintenance   Topic Date Due    DTaP/Tdap/Td vaccine (1 - Tdap) Never done    Shingles vaccine (1 of 2) Never done    DEXA (modify frequency per FRAX score)  Never done    COVID-19 Vaccine (6 - 2023-24 season) 09/01/2023    Lipids  12/19/2023    Depression Screen  11/17/2024    Flu vaccine  Completed    Pneumococcal 65+ years Vaccine  Completed    Hepatitis A vaccine  Aged Out    Hepatitis B vaccine  Aged Out    Hib vaccine  Aged Out    Meningococcal (ACWY) vaccine  Aged Out       ASSESSMENT/PLAN:  1. Well adult exam  Stable;  79 yo wellness exam.  Continue to eat well. Labs ordered. Patient agreeable to DEXA scan. 2. Pain in both knees, unspecified chronicity  Not progressing; Refilled voltaren gel. Continue recommendations from ortho. - diclofenac sodium (VOLTAREN) 1 % GEL; Apply topically 4 times daily as needed for Pain  Dispense: 100 g; Refill: 0    3. Macular degeneration, unspecified laterality, unspecified type  Stable;  Patient continues with ophthalmology. 4. Hyperlipidemia, unspecified hyperlipidemia type  Stable;  Patient doing well on lipitor. Recheck. - Lipid Panel; Future    5.

## 2023-11-20 NOTE — TELEPHONE ENCOUNTER
Requested Prescriptions     Pending Prescriptions Disp Refills    aspirin 325 MG tablet [Pharmacy Med Name: aspirin 325 mg tablet] 90 tablet 0     Sig: TAKE 1 TABLET EVERY DAY    metoprolol succinate (TOPROL XL) 50 MG extended release tablet [Pharmacy Med Name: metoprolol succinate ER 50 mg tablet,extended release 24 hr] 90 tablet 0     Sig: Take 1 tablet by mouth daily    atorvastatin (LIPITOR) 40 MG tablet [Pharmacy Med Name: atorvastatin 40 mg tablet] 90 tablet 0     Sig: Take 1 tablet by mouth daily     Last OV - 12/16/22  Next OV - today  Last refill - 8/31/23  Last labs - 12/19/22

## 2023-11-20 NOTE — TELEPHONE ENCOUNTER
Requested Prescriptions     Pending Prescriptions Disp Refills    Misc. Devices MISC 1 each 0     Sig: Please provide two mastectomy bras of patient's choice. Dx: s/p left breast mastectomy. VERITO: lifelong.    vitamin D (ERGOCALCIFEROL) 1.25 MG (21366 UT) CAPS capsule 12 capsule 0     Sig: Take 1 capsule by mouth once a week    atorvastatin (LIPITOR) 40 MG tablet 90 tablet 0     Sig: Take 1 tablet by mouth daily    aspirin 325 MG tablet 90 tablet 0     Sig: Take 1 tablet by mouth daily    metoprolol succinate (TOPROL XL) 50 MG extended release tablet 90 tablet 0     Sig: Take 1 tablet by mouth daily    diclofenac sodium (VOLTAREN) 1 %  g 0     Sig: Apply topically 4 times daily as needed for Pain     Last OV - 12/16/22  Next OV - today  Last refill - 8/31/23  Last labs - 12/19/22

## 2023-11-21 DIAGNOSIS — Z90.12 HISTORY OF MASTECTOMY, LEFT: ICD-10-CM

## 2023-11-21 RX ORDER — METOPROLOL SUCCINATE 50 MG/1
50 TABLET, EXTENDED RELEASE ORAL DAILY
Qty: 90 TABLET | Refills: 0 | OUTPATIENT
Start: 2023-11-21

## 2023-11-21 RX ORDER — ASPIRIN 325 MG
325 TABLET ORAL DAILY
Qty: 90 TABLET | Refills: 0 | OUTPATIENT
Start: 2023-11-21

## 2023-11-21 RX ORDER — ERGOCALCIFEROL 1.25 MG/1
50000 CAPSULE ORAL WEEKLY
Qty: 12 CAPSULE | Refills: 0 | OUTPATIENT
Start: 2023-11-21

## 2023-11-21 RX ORDER — ATORVASTATIN CALCIUM 40 MG/1
40 TABLET, FILM COATED ORAL DAILY
Qty: 90 TABLET | Refills: 0 | OUTPATIENT
Start: 2023-11-21

## 2023-11-25 PROBLEM — H35.30 MACULAR DEGENERATION: Status: ACTIVE | Noted: 2023-11-25

## 2023-11-25 PROBLEM — M25.562 PAIN IN BOTH KNEES: Status: ACTIVE | Noted: 2023-11-25

## 2023-11-25 PROBLEM — M25.561 PAIN IN BOTH KNEES: Status: ACTIVE | Noted: 2023-11-25

## 2023-11-28 DIAGNOSIS — Z13.0 SCREENING FOR DEFICIENCY ANEMIA: ICD-10-CM

## 2023-11-28 DIAGNOSIS — E55.9 VITAMIN D DEFICIENCY: ICD-10-CM

## 2023-11-28 DIAGNOSIS — I10 ESSENTIAL HYPERTENSION: ICD-10-CM

## 2023-11-28 DIAGNOSIS — E78.5 HYPERLIPIDEMIA, UNSPECIFIED HYPERLIPIDEMIA TYPE: ICD-10-CM

## 2023-11-28 LAB
25(OH)D3 SERPL-MCNC: 95.7 NG/ML
ALBUMIN SERPL-MCNC: 4.1 G/DL (ref 3.4–5)
ALBUMIN/GLOB SERPL: 1.4 {RATIO} (ref 1.1–2.2)
ALP SERPL-CCNC: 84 U/L (ref 40–129)
ALT SERPL-CCNC: 10 U/L (ref 10–40)
ANION GAP SERPL CALCULATED.3IONS-SCNC: 14 MMOL/L (ref 3–16)
AST SERPL-CCNC: 17 U/L (ref 15–37)
BILIRUB SERPL-MCNC: 0.5 MG/DL (ref 0–1)
BUN SERPL-MCNC: 24 MG/DL (ref 7–20)
CALCIUM SERPL-MCNC: 9.7 MG/DL (ref 8.3–10.6)
CHLORIDE SERPL-SCNC: 101 MMOL/L (ref 99–110)
CHOLEST SERPL-MCNC: 145 MG/DL (ref 0–199)
CO2 SERPL-SCNC: 25 MMOL/L (ref 21–32)
CREAT SERPL-MCNC: 1.1 MG/DL (ref 0.6–1.2)
DEPRECATED RDW RBC AUTO: 13.6 % (ref 12.4–15.4)
GFR SERPLBLD CREATININE-BSD FMLA CKD-EPI: 49 ML/MIN/{1.73_M2}
GLUCOSE SERPL-MCNC: 105 MG/DL (ref 70–99)
HCT VFR BLD AUTO: 38.2 % (ref 36–48)
HDLC SERPL-MCNC: 72 MG/DL (ref 40–60)
HGB BLD-MCNC: 13 G/DL (ref 12–16)
LDLC SERPL CALC-MCNC: 60 MG/DL
MCH RBC QN AUTO: 30.5 PG (ref 26–34)
MCHC RBC AUTO-ENTMCNC: 34.1 G/DL (ref 31–36)
MCV RBC AUTO: 89.5 FL (ref 80–100)
PLATELET # BLD AUTO: 296 K/UL (ref 135–450)
PMV BLD AUTO: 8.5 FL (ref 5–10.5)
POTASSIUM SERPL-SCNC: 4.6 MMOL/L (ref 3.5–5.1)
PROT SERPL-MCNC: 7 G/DL (ref 6.4–8.2)
RBC # BLD AUTO: 4.27 M/UL (ref 4–5.2)
SODIUM SERPL-SCNC: 140 MMOL/L (ref 136–145)
TRIGL SERPL-MCNC: 67 MG/DL (ref 0–150)
TSH SERPL DL<=0.005 MIU/L-ACNC: 3.66 UIU/ML (ref 0.27–4.2)
VIT B12 SERPL-MCNC: 447 PG/ML (ref 211–911)
VLDLC SERPL CALC-MCNC: 13 MG/DL
WBC # BLD AUTO: 6.8 K/UL (ref 4–11)

## 2024-02-17 DIAGNOSIS — I10 ESSENTIAL HYPERTENSION: ICD-10-CM

## 2024-02-17 DIAGNOSIS — M25.561 PAIN IN BOTH KNEES, UNSPECIFIED CHRONICITY: ICD-10-CM

## 2024-02-17 DIAGNOSIS — M25.562 PAIN IN BOTH KNEES, UNSPECIFIED CHRONICITY: ICD-10-CM

## 2024-02-17 DIAGNOSIS — Z86.73 HISTORY OF STROKE: ICD-10-CM

## 2024-02-17 DIAGNOSIS — E55.9 VITAMIN D DEFICIENCY: ICD-10-CM

## 2024-02-19 RX ORDER — ASPIRIN 325 MG
325 TABLET ORAL DAILY
Qty: 90 TABLET | Refills: 0 | Status: SHIPPED | OUTPATIENT
Start: 2024-02-19

## 2024-02-19 RX ORDER — METOPROLOL SUCCINATE 50 MG/1
50 TABLET, EXTENDED RELEASE ORAL DAILY
Qty: 90 TABLET | Refills: 0 | Status: SHIPPED | OUTPATIENT
Start: 2024-02-19

## 2024-02-19 RX ORDER — ATORVASTATIN CALCIUM 40 MG/1
40 TABLET, FILM COATED ORAL DAILY
Qty: 90 TABLET | Refills: 0 | Status: SHIPPED | OUTPATIENT
Start: 2024-02-19

## 2024-02-19 RX ORDER — ERGOCALCIFEROL 1.25 MG/1
50000 CAPSULE ORAL WEEKLY
Qty: 12 CAPSULE | Refills: 0 | Status: SHIPPED | OUTPATIENT
Start: 2024-02-19

## 2024-02-19 NOTE — TELEPHONE ENCOUNTER
Medication:   Requested Prescriptions     Pending Prescriptions Disp Refills    aspirin 325 MG tablet [Pharmacy Med Name: aspirin 325 mg tablet] 90 tablet 0     Sig: TAKE 1 TABLET BY MOUTH DAILY    vitamin D (ERGOCALCIFEROL) 1.25 MG (71533 UT) CAPS capsule [Pharmacy Med Name: ergocalciferol (vitamin D2) 1,250 mcg (50,000 unit) capsule] 12 capsule 0     Sig: TAKE 1 CAPSULE BY MOUTH once a week    atorvastatin (LIPITOR) 40 MG tablet [Pharmacy Med Name: atorvastatin 40 mg tablet] 90 tablet 0     Sig: TAKE 1 TABLET BY MOUTH DAILY    metoprolol succinate (TOPROL XL) 50 MG extended release tablet [Pharmacy Med Name: metoprolol succinate ER 50 mg tablet,extended release 24 hr] 90 tablet 0     Sig: TAKE 1 TABLET BY MOUTH DAILY    diclofenac sodium (VOLTAREN) 1 % GEL [Pharmacy Med Name: diclofenac 1 % topical gel] 100 g 0     Sig: APPLY TO THE AFFECTED AREA(S) FOUR TIMES DAILY AS NEEDED FOR PAIN        Last Filled:  11/20/23    Patient Phone Number: [phone] (home)     Last appt: 11/20/2023   Next appt: Visit date not found    Last OARRS:        No data to display

## 2024-05-12 DIAGNOSIS — I10 ESSENTIAL HYPERTENSION: ICD-10-CM

## 2024-05-12 DIAGNOSIS — M25.562 PAIN IN BOTH KNEES, UNSPECIFIED CHRONICITY: ICD-10-CM

## 2024-05-12 DIAGNOSIS — E55.9 VITAMIN D DEFICIENCY: ICD-10-CM

## 2024-05-12 DIAGNOSIS — Z86.73 HISTORY OF STROKE: ICD-10-CM

## 2024-05-12 DIAGNOSIS — M25.561 PAIN IN BOTH KNEES, UNSPECIFIED CHRONICITY: ICD-10-CM

## 2024-05-13 RX ORDER — ASPIRIN 325 MG
325 TABLET ORAL DAILY
Qty: 90 TABLET | Refills: 0 | Status: SHIPPED | OUTPATIENT
Start: 2024-05-13

## 2024-05-13 RX ORDER — ERGOCALCIFEROL 1.25 MG/1
50000 CAPSULE ORAL WEEKLY
Qty: 12 CAPSULE | Refills: 0 | Status: SHIPPED | OUTPATIENT
Start: 2024-05-13

## 2024-05-13 RX ORDER — METOPROLOL SUCCINATE 50 MG/1
50 TABLET, EXTENDED RELEASE ORAL DAILY
Qty: 90 TABLET | Refills: 0 | Status: SHIPPED | OUTPATIENT
Start: 2024-05-13

## 2024-05-13 RX ORDER — ATORVASTATIN CALCIUM 40 MG/1
40 TABLET, FILM COATED ORAL DAILY
Qty: 90 TABLET | Refills: 0 | Status: SHIPPED | OUTPATIENT
Start: 2024-05-13

## 2024-05-13 NOTE — TELEPHONE ENCOUNTER
Medication:   Requested Prescriptions     Pending Prescriptions Disp Refills    metoprolol succinate (TOPROL XL) 50 MG extended release tablet [Pharmacy Med Name: metoprolol succinate ER 50 mg tablet,extended release 24 hr] 90 tablet 0     Sig: TAKE 1 TABLET BY MOUTH DAILY    atorvastatin (LIPITOR) 40 MG tablet [Pharmacy Med Name: atorvastatin 40 mg tablet] 90 tablet 0     Sig: TAKE 1 TABLET BY MOUTH DAILY    aspirin 325 MG tablet [Pharmacy Med Name: aspirin 325 mg tablet] 90 tablet 0     Sig: TAKE 1 TABLET BY MOUTH DAILY    vitamin D (ERGOCALCIFEROL) 1.25 MG (61019 UT) CAPS capsule [Pharmacy Med Name: ergocalciferol (vitamin D2) 1,250 mcg (50,000 unit) capsule] 12 capsule 0     Sig: TAKE 1 CAPSULE BY MOUTH once a week    diclofenac sodium (VOLTAREN) 1 % GEL [Pharmacy Med Name: diclofenac 1 % topical gel] 100 g 0     Sig: APPLY TO THE AFFECTED AREA(S) FOUR TIMES DAILY AS NEEDED FOR PAIN        Last Filled:      Patient Phone Number: [phone] (home)     Last appt: 11/20/2023   Next appt: Visit date not found    Last OARRS:        No data to display

## 2024-06-06 ENCOUNTER — OFFICE VISIT (OUTPATIENT)
Dept: FAMILY MEDICINE CLINIC | Age: 84
End: 2024-06-06
Payer: COMMERCIAL

## 2024-06-06 VITALS
SYSTOLIC BLOOD PRESSURE: 124 MMHG | HEIGHT: 58 IN | DIASTOLIC BLOOD PRESSURE: 66 MMHG | WEIGHT: 112 LBS | BODY MASS INDEX: 23.51 KG/M2 | HEART RATE: 65 BPM

## 2024-06-06 DIAGNOSIS — M25.562 PAIN IN BOTH KNEES, UNSPECIFIED CHRONICITY: ICD-10-CM

## 2024-06-06 DIAGNOSIS — M25.561 PAIN IN BOTH KNEES, UNSPECIFIED CHRONICITY: ICD-10-CM

## 2024-06-06 DIAGNOSIS — I10 ESSENTIAL HYPERTENSION: ICD-10-CM

## 2024-06-06 DIAGNOSIS — Z86.73 HISTORY OF STROKE: ICD-10-CM

## 2024-06-06 DIAGNOSIS — H91.90 HEARING LOSS, UNSPECIFIED HEARING LOSS TYPE, UNSPECIFIED LATERALITY: Primary | ICD-10-CM

## 2024-06-06 DIAGNOSIS — E78.5 HYPERLIPIDEMIA, UNSPECIFIED HYPERLIPIDEMIA TYPE: ICD-10-CM

## 2024-06-06 DIAGNOSIS — E55.9 VITAMIN D DEFICIENCY: ICD-10-CM

## 2024-06-06 DIAGNOSIS — H35.30 MACULAR DEGENERATION, UNSPECIFIED LATERALITY, UNSPECIFIED TYPE: ICD-10-CM

## 2024-06-06 PROCEDURE — G8420 CALC BMI NORM PARAMETERS: HCPCS | Performed by: NURSE PRACTITIONER

## 2024-06-06 PROCEDURE — G8427 DOCREV CUR MEDS BY ELIG CLIN: HCPCS | Performed by: NURSE PRACTITIONER

## 2024-06-06 PROCEDURE — 1123F ACP DISCUSS/DSCN MKR DOCD: CPT | Performed by: NURSE PRACTITIONER

## 2024-06-06 PROCEDURE — 99214 OFFICE O/P EST MOD 30 MIN: CPT | Performed by: NURSE PRACTITIONER

## 2024-06-06 PROCEDURE — 1036F TOBACCO NON-USER: CPT | Performed by: NURSE PRACTITIONER

## 2024-06-06 PROCEDURE — 1090F PRES/ABSN URINE INCON ASSESS: CPT | Performed by: NURSE PRACTITIONER

## 2024-06-06 PROCEDURE — 3078F DIAST BP <80 MM HG: CPT | Performed by: NURSE PRACTITIONER

## 2024-06-06 PROCEDURE — G8400 PT W/DXA NO RESULTS DOC: HCPCS | Performed by: NURSE PRACTITIONER

## 2024-06-06 PROCEDURE — 3074F SYST BP LT 130 MM HG: CPT | Performed by: NURSE PRACTITIONER

## 2024-06-06 RX ORDER — ASPIRIN 325 MG
325 TABLET ORAL DAILY
Qty: 90 TABLET | Refills: 0 | Status: SHIPPED | OUTPATIENT
Start: 2024-06-06

## 2024-06-06 RX ORDER — METOPROLOL SUCCINATE 50 MG/1
50 TABLET, EXTENDED RELEASE ORAL DAILY
Qty: 90 TABLET | Refills: 0 | Status: SHIPPED | OUTPATIENT
Start: 2024-06-06

## 2024-06-06 RX ORDER — ERGOCALCIFEROL 1.25 MG/1
50000 CAPSULE ORAL WEEKLY
Qty: 12 CAPSULE | Refills: 0 | Status: SHIPPED | OUTPATIENT
Start: 2024-06-06

## 2024-06-06 RX ORDER — ATORVASTATIN CALCIUM 40 MG/1
40 TABLET, FILM COATED ORAL DAILY
Qty: 90 TABLET | Refills: 0 | Status: SHIPPED | OUTPATIENT
Start: 2024-06-06

## 2024-06-06 SDOH — ECONOMIC STABILITY: FOOD INSECURITY: WITHIN THE PAST 12 MONTHS, YOU WORRIED THAT YOUR FOOD WOULD RUN OUT BEFORE YOU GOT MONEY TO BUY MORE.: NEVER TRUE

## 2024-06-06 SDOH — ECONOMIC STABILITY: HOUSING INSECURITY
IN THE LAST 12 MONTHS, WAS THERE A TIME WHEN YOU DID NOT HAVE A STEADY PLACE TO SLEEP OR SLEPT IN A SHELTER (INCLUDING NOW)?: NO

## 2024-06-06 SDOH — ECONOMIC STABILITY: INCOME INSECURITY: HOW HARD IS IT FOR YOU TO PAY FOR THE VERY BASICS LIKE FOOD, HOUSING, MEDICAL CARE, AND HEATING?: NOT HARD AT ALL

## 2024-06-06 SDOH — ECONOMIC STABILITY: FOOD INSECURITY: WITHIN THE PAST 12 MONTHS, THE FOOD YOU BOUGHT JUST DIDN'T LAST AND YOU DIDN'T HAVE MONEY TO GET MORE.: NEVER TRUE

## 2024-06-06 ASSESSMENT — PATIENT HEALTH QUESTIONNAIRE - PHQ9
SUM OF ALL RESPONSES TO PHQ QUESTIONS 1-9: 0
SUM OF ALL RESPONSES TO PHQ QUESTIONS 1-9: 0
SUM OF ALL RESPONSES TO PHQ9 QUESTIONS 1 & 2: 0
1. LITTLE INTEREST OR PLEASURE IN DOING THINGS: NOT AT ALL
SUM OF ALL RESPONSES TO PHQ QUESTIONS 1-9: 0
SUM OF ALL RESPONSES TO PHQ QUESTIONS 1-9: 0
2. FEELING DOWN, DEPRESSED OR HOPELESS: NOT AT ALL

## 2024-06-06 NOTE — PROGRESS NOTES
Denise Hoff (:  1940) is a 83 y.o. female,Established patient, here for evaluation of the following chief complaint(s):  Medication Refill and Other (Sign paperwork//Handicap placard )      Assessment & Plan   1. Hearing loss, unspecified hearing loss type, unspecified laterality  Not progressing;  Patient has been recommended to get hearing aids.  Continue recommendations from audiology.  2. Pain in both knees, unspecified chronicity  Stable;  Refilled voltaren gel.  -     diclofenac sodium (VOLTAREN) 1 % GEL; APPLY TO THE AFFECTED AREA(S) FOUR TIMES DAILY AS NEEDED FOR PAIN, Disp-100 g, R-0, Normal  3. Macular degeneration, unspecified laterality, unspecified type  Stable;  Patient follows with ophthalmology for injections.  4. Hyperlipidemia, unspecified hyperlipidemia type  Stable;  Patient doing well on lipitor.  Continue current regimen.  5. Essential hypertension  Stable;  BP good at visit.  Continue metoprolol.  -     metoprolol succinate (TOPROL XL) 50 MG extended release tablet; Take 1 tablet by mouth daily, Disp-90 tablet, R-0Normal  6. History of stroke  Stable;  Encouraged f/u with neurology.  Continue lipitor and ASA.  -     atorvastatin (LIPITOR) 40 MG tablet; Take 1 tablet by mouth daily, Disp-90 tablet, R-0Normal  -     aspirin 325 MG tablet; Take 1 tablet by mouth daily, Disp-90 tablet, R-0Normal  7. Vitamin D deficiency  Stable;  Refilled vitamin D.  -     vitamin D (ERGOCALCIFEROL) 1.25 MG (65241 UT) CAPS capsule; Take 1 capsule by mouth once a week, Disp-12 capsule, R-0Normal      Return in about 6 months (around 2024).       Subjective   HPI  Hearing loss  Had appointment yesterday with audiology  Worried about cerumen impaction- son cleaned out her ears yesterda  Is recommending hearing aids    Bilateral knee pain  Hx of right knee replacement  Dx with arthritis  Is much better- if walks 1-2 hours is better; if sits or stands has issues  Ointment helps  Saw ortho- injection

## 2024-11-07 DIAGNOSIS — M25.562 PAIN IN BOTH KNEES, UNSPECIFIED CHRONICITY: ICD-10-CM

## 2024-11-07 DIAGNOSIS — Z86.73 HISTORY OF STROKE: ICD-10-CM

## 2024-11-07 DIAGNOSIS — I10 ESSENTIAL HYPERTENSION: ICD-10-CM

## 2024-11-07 DIAGNOSIS — E55.9 VITAMIN D DEFICIENCY: ICD-10-CM

## 2024-11-07 DIAGNOSIS — M25.561 PAIN IN BOTH KNEES, UNSPECIFIED CHRONICITY: ICD-10-CM

## 2024-11-07 RX ORDER — ERGOCALCIFEROL 1.25 MG/1
50000 CAPSULE, LIQUID FILLED ORAL WEEKLY
Qty: 12 CAPSULE | Refills: 0 | Status: SHIPPED | OUTPATIENT
Start: 2024-11-07

## 2024-11-07 RX ORDER — ASPIRIN 325 MG
325 TABLET ORAL DAILY
Qty: 90 TABLET | Refills: 0 | Status: SHIPPED | OUTPATIENT
Start: 2024-11-07

## 2024-11-07 RX ORDER — ATORVASTATIN CALCIUM 40 MG/1
40 TABLET, FILM COATED ORAL DAILY
Qty: 90 TABLET | Refills: 0 | Status: SHIPPED | OUTPATIENT
Start: 2024-11-07

## 2024-11-07 RX ORDER — METOPROLOL SUCCINATE 50 MG/1
50 TABLET, EXTENDED RELEASE ORAL DAILY
Qty: 90 TABLET | Refills: 0 | Status: SHIPPED | OUTPATIENT
Start: 2024-11-07

## 2024-11-07 NOTE — TELEPHONE ENCOUNTER
Medication:   Requested Prescriptions     Pending Prescriptions Disp Refills    atorvastatin (LIPITOR) 40 MG tablet 90 tablet 0     Sig: Take 1 tablet by mouth daily    aspirin 325 MG tablet 90 tablet 0     Sig: Take 1 tablet by mouth daily    diclofenac sodium (VOLTAREN) 1 %  g 0     Sig: APPLY TO THE AFFECTED AREA(S) FOUR TIMES DAILY AS NEEDED FOR PAIN    metoprolol succinate (TOPROL XL) 50 MG extended release tablet 90 tablet 0     Sig: Take 1 tablet by mouth daily    vitamin D (ERGOCALCIFEROL) 1.25 MG (25947 UT) CAPS capsule 12 capsule 0     Sig: Take 1 capsule by mouth once a week       Last Filled:  6/6/24    Patient Phone Number: [phone] (home)     Last appt: 6/6/2024   Next appt: Visit date not found    Last Labs DM: No results found for: \"LABA1C\"  Last Lipid:   Lab Results   Component Value Date/Time    CHOL 145 11/28/2023 09:58 AM    TRIG 67 11/28/2023 09:58 AM    HDL 72 11/28/2023 09:58 AM     Last PSA: No results found for: \"PSA\"  Last Thyroid:   Lab Results   Component Value Date/Time    TSH 3.66 11/28/2023 09:58 AM    T4FREE 1.6 04/11/2022 12:01 PM

## 2025-02-08 DIAGNOSIS — Z86.73 HISTORY OF STROKE: ICD-10-CM

## 2025-02-08 DIAGNOSIS — E55.9 VITAMIN D DEFICIENCY: ICD-10-CM

## 2025-02-08 DIAGNOSIS — M25.561 PAIN IN BOTH KNEES, UNSPECIFIED CHRONICITY: ICD-10-CM

## 2025-02-08 DIAGNOSIS — M25.562 PAIN IN BOTH KNEES, UNSPECIFIED CHRONICITY: ICD-10-CM

## 2025-02-08 DIAGNOSIS — I10 ESSENTIAL HYPERTENSION: ICD-10-CM

## 2025-02-10 RX ORDER — ATORVASTATIN CALCIUM 40 MG/1
40 TABLET, FILM COATED ORAL DAILY
Qty: 90 TABLET | Refills: 0 | Status: SHIPPED | OUTPATIENT
Start: 2025-02-10

## 2025-02-10 RX ORDER — METOPROLOL SUCCINATE 50 MG/1
50 TABLET, EXTENDED RELEASE ORAL DAILY
Qty: 90 TABLET | Refills: 0 | Status: SHIPPED | OUTPATIENT
Start: 2025-02-10

## 2025-02-10 RX ORDER — ERGOCALCIFEROL 1.25 MG/1
50000 CAPSULE, LIQUID FILLED ORAL WEEKLY
Qty: 12 CAPSULE | Refills: 0 | Status: SHIPPED | OUTPATIENT
Start: 2025-02-10

## 2025-02-10 RX ORDER — ASPIRIN 325 MG
325 TABLET ORAL DAILY
Qty: 90 TABLET | Refills: 0 | Status: SHIPPED | OUTPATIENT
Start: 2025-02-10

## 2025-02-10 NOTE — TELEPHONE ENCOUNTER
Medication:   Requested Prescriptions     Pending Prescriptions Disp Refills    aspirin 325 MG tablet [Pharmacy Med Name: aspirin 325 mg tablet] 90 tablet 0     Sig: Take 1 tablet by mouth daily    atorvastatin (LIPITOR) 40 MG tablet [Pharmacy Med Name: atorvastatin 40 mg tablet] 90 tablet 0     Sig: Take 1 tablet by mouth daily    diclofenac sodium (VOLTAREN) 1 % GEL [Pharmacy Med Name: diclofenac 1 % topical gel] 100 g 0     Sig: APPLY TO THE AFFECTED AREA(S) FOUR TIMES DAILY AS NEEDED FOR PAIN    metoprolol succinate (TOPROL XL) 50 MG extended release tablet [Pharmacy Med Name: metoprolol succinate ER 50 mg tablet,extended release 24 hr] 90 tablet 0     Sig: Take 1 tablet by mouth daily    vitamin D (ERGOCALCIFEROL) 1.25 MG (19387 UT) CAPS capsule [Pharmacy Med Name: ergocalciferol (vitamin D2) 1,250 mcg (50,000 unit) capsule] 12 capsule 0     Sig: Take 1 capsule by mouth once a week        Last Filled:  11/7/24    Patient Phone Number: [phone] (home)     Last appt: 6/6/2024   Next appt: 2/13/2025    Last OARRS:        No data to display

## 2025-02-13 ENCOUNTER — OFFICE VISIT (OUTPATIENT)
Dept: FAMILY MEDICINE CLINIC | Age: 85
End: 2025-02-13
Payer: COMMERCIAL

## 2025-02-13 VITALS
DIASTOLIC BLOOD PRESSURE: 64 MMHG | HEIGHT: 58 IN | SYSTOLIC BLOOD PRESSURE: 110 MMHG | WEIGHT: 115 LBS | BODY MASS INDEX: 24.14 KG/M2

## 2025-02-13 DIAGNOSIS — M25.562 PAIN IN BOTH KNEES, UNSPECIFIED CHRONICITY: ICD-10-CM

## 2025-02-13 DIAGNOSIS — H35.30 MACULAR DEGENERATION, UNSPECIFIED LATERALITY, UNSPECIFIED TYPE: ICD-10-CM

## 2025-02-13 DIAGNOSIS — Z00.00 WELL ADULT EXAM: Primary | ICD-10-CM

## 2025-02-13 DIAGNOSIS — E78.5 HYPERLIPIDEMIA, UNSPECIFIED HYPERLIPIDEMIA TYPE: ICD-10-CM

## 2025-02-13 DIAGNOSIS — I10 ESSENTIAL HYPERTENSION: ICD-10-CM

## 2025-02-13 DIAGNOSIS — Z91.81 AT HIGH RISK FOR FALLS: ICD-10-CM

## 2025-02-13 DIAGNOSIS — Z86.73 HISTORY OF STROKE: ICD-10-CM

## 2025-02-13 DIAGNOSIS — M25.561 PAIN IN BOTH KNEES, UNSPECIFIED CHRONICITY: ICD-10-CM

## 2025-02-13 PROCEDURE — 99397 PER PM REEVAL EST PAT 65+ YR: CPT | Performed by: NURSE PRACTITIONER

## 2025-02-13 SDOH — ECONOMIC STABILITY: FOOD INSECURITY: WITHIN THE PAST 12 MONTHS, THE FOOD YOU BOUGHT JUST DIDN'T LAST AND YOU DIDN'T HAVE MONEY TO GET MORE.: NEVER TRUE

## 2025-02-13 SDOH — ECONOMIC STABILITY: FOOD INSECURITY: WITHIN THE PAST 12 MONTHS, YOU WORRIED THAT YOUR FOOD WOULD RUN OUT BEFORE YOU GOT MONEY TO BUY MORE.: NEVER TRUE

## 2025-02-13 ASSESSMENT — PATIENT HEALTH QUESTIONNAIRE - PHQ9
SUM OF ALL RESPONSES TO PHQ9 QUESTIONS 1 & 2: 0
7. TROUBLE CONCENTRATING ON THINGS, SUCH AS READING THE NEWSPAPER OR WATCHING TELEVISION: NOT AT ALL
SUM OF ALL RESPONSES TO PHQ QUESTIONS 1-9: 0
6. FEELING BAD ABOUT YOURSELF - OR THAT YOU ARE A FAILURE OR HAVE LET YOURSELF OR YOUR FAMILY DOWN: NOT AT ALL
8. MOVING OR SPEAKING SO SLOWLY THAT OTHER PEOPLE COULD HAVE NOTICED. OR THE OPPOSITE, BEING SO FIGETY OR RESTLESS THAT YOU HAVE BEEN MOVING AROUND A LOT MORE THAN USUAL: NOT AT ALL
SUM OF ALL RESPONSES TO PHQ QUESTIONS 1-9: 0
5. POOR APPETITE OR OVEREATING: NOT AT ALL
9. THOUGHTS THAT YOU WOULD BE BETTER OFF DEAD, OR OF HURTING YOURSELF: NOT AT ALL
3. TROUBLE FALLING OR STAYING ASLEEP: NOT AT ALL
2. FEELING DOWN, DEPRESSED OR HOPELESS: NOT AT ALL
SUM OF ALL RESPONSES TO PHQ QUESTIONS 1-9: 0
4. FEELING TIRED OR HAVING LITTLE ENERGY: NOT AT ALL
10. IF YOU CHECKED OFF ANY PROBLEMS, HOW DIFFICULT HAVE THESE PROBLEMS MADE IT FOR YOU TO DO YOUR WORK, TAKE CARE OF THINGS AT HOME, OR GET ALONG WITH OTHER PEOPLE: NOT DIFFICULT AT ALL
SUM OF ALL RESPONSES TO PHQ QUESTIONS 1-9: 0
1. LITTLE INTEREST OR PLEASURE IN DOING THINGS: NOT AT ALL

## 2025-02-13 NOTE — PROGRESS NOTES
[facility]  [facility]  [address] [place] [address]  Phone: [phone]    2025    Denise Hoff (:  1940) is a 84 y.o. female, here for a preventive medicine evaluation.    Patient Active Problem List   Diagnosis    Essential hypertension    Hyperlipidemia    History of stroke    Cataract, right eye    Memory problem    Vitamin D deficiency    History of mastectomy, left    Sensory hearing loss    Macular degeneration    Pain in both knees     Macular degeneration  Cannot see much  Has been getting injection in left eye- has an appointment next week     HLD  Is taking atorvastatin  No myaglias     HYPERTENSION  BP good at visit  Is taking metoprolol    Hx of stroke  On lipitor and ASA  No deficits  Referred to neurology- not sure if she saw them    Bilateral knee pain  Has been applying voltaren  Hx of injections- would like to consider again    Review of Systems    Prior to Visit Medications    Medication Sig Taking? Authorizing Provider   aspirin 325 MG tablet Take 1 tablet by mouth daily Yes Mindy [place] APRN - CNP   atorvastatin (LIPITOR) 40 MG tablet Take 1 tablet by mouth daily Yes Mindy [place] APRN - CNP   diclofenac sodium (VOLTAREN) 1 % GEL APPLY TO THE AFFECTED AREA(S) FOUR TIMES DAILY AS NEEDED FOR PAIN Yes Mindy [place] APRN - CNP   metoprolol succinate (TOPROL XL) 50 MG extended release tablet Take 1 tablet by mouth daily Yes Mindy [place] APRN - CNP   vitamin D (ERGOCALCIFEROL) 1.25 MG (10606 UT) CAPS capsule Take 1 capsule by mouth once a week Yes Mindy [place] APRN - CNP   Misc. Devices MISC Please provide two mastectomy bras of patient's choice. Dx: s/p left breast mastectomy. VERITO: lifelong.  Mindy Leroy APRN - CNP        No Known Allergies    Past Medical History:   Diagnosis Date    Breast cancer, left breast (HCC)     Hyperlipidemia     Hypertension     Stroke (cerebrum) (HCC)        Past Surgical History:

## 2025-04-21 ENCOUNTER — PATIENT MESSAGE (OUTPATIENT)
Dept: FAMILY MEDICINE CLINIC | Age: 85
End: 2025-04-21

## 2025-04-21 DIAGNOSIS — M25.561 PAIN IN BOTH KNEES, UNSPECIFIED CHRONICITY: ICD-10-CM

## 2025-04-21 DIAGNOSIS — M25.562 PAIN IN BOTH KNEES, UNSPECIFIED CHRONICITY: ICD-10-CM

## 2025-04-23 DIAGNOSIS — M25.561 PAIN IN BOTH KNEES, UNSPECIFIED CHRONICITY: ICD-10-CM

## 2025-04-23 DIAGNOSIS — M25.562 PAIN IN BOTH KNEES, UNSPECIFIED CHRONICITY: ICD-10-CM

## 2025-04-24 NOTE — TELEPHONE ENCOUNTER
Refill Request       Last Seen: Last Seen Department: 2/13/2025  Last Seen by PCP: Visit date not found    Last Written: 2/10/2025    Next Appointment:   No future appointments.    Message to  to schedule appointment.         Requested Prescriptions     Pending Prescriptions Disp Refills    diclofenac sodium (VOLTAREN) 1 % GEL [Pharmacy Med Name: diclofenac 1 % topical gel] 100 g 0     Sig: APPLY TO THE AFFECTED AREA(S) FOUR TIMES DAILY AS NEEDED FOR PAIN

## 2025-04-24 NOTE — TELEPHONE ENCOUNTER
Spoke with daughter about vaccines    Medication:   Requested Prescriptions     Pending Prescriptions Disp Refills    diclofenac sodium (VOLTAREN) 1 %  g 0     Sig: APPLY TO THE AFFECTED AREA(S) FOUR TIMES DAILY AS NEEDED FOR PAIN        Last Filled:  2/25/25    Patient Phone Number: [phone] (home)     Last appt: 2/13/2025   Next appt: 4/23/2025    Last OARRS:        No data to display

## 2025-04-24 NOTE — TELEPHONE ENCOUNTER
Patients daughter called to follow up on her petros message. She would like someone to return her call.

## 2025-04-24 NOTE — TELEPHONE ENCOUNTER
Medication:   Requested Prescriptions     Pending Prescriptions Disp Refills    diclofenac sodium (VOLTAREN) 1 %  g 0     Sig: APPLY TO THE AFFECTED AREA(S) FOUR TIMES DAILY AS NEEDED FOR PAIN        Last Filled:  2/10/25    Patient Phone Number: [phone] (home)     Last appt: 2/13/2025   Next appt: 4/23/2025    Last OARRS:        No data to display

## 2025-04-29 DIAGNOSIS — M25.562 PAIN IN BOTH KNEES, UNSPECIFIED CHRONICITY: ICD-10-CM

## 2025-04-29 DIAGNOSIS — M25.561 PAIN IN BOTH KNEES, UNSPECIFIED CHRONICITY: ICD-10-CM

## 2025-05-10 DIAGNOSIS — Z86.73 HISTORY OF STROKE: ICD-10-CM

## 2025-05-10 DIAGNOSIS — E55.9 VITAMIN D DEFICIENCY: ICD-10-CM

## 2025-05-12 RX ORDER — ERGOCALCIFEROL 1.25 MG/1
50000 CAPSULE, LIQUID FILLED ORAL WEEKLY
Qty: 12 CAPSULE | Refills: 0 | Status: SHIPPED | OUTPATIENT
Start: 2025-05-12

## 2025-05-12 RX ORDER — ASPIRIN 325 MG
325 TABLET ORAL DAILY
Qty: 90 TABLET | Refills: 0 | Status: SHIPPED | OUTPATIENT
Start: 2025-05-12

## 2025-05-12 NOTE — TELEPHONE ENCOUNTER
Medication:   Requested Prescriptions     Pending Prescriptions Disp Refills    vitamin D (ERGOCALCIFEROL) 1.25 MG (99238 UT) CAPS capsule [Pharmacy Med Name: ergocalciferol (vitamin D2) 1,250 mcg (50,000 unit) capsule] 12 capsule 0     Sig: Take 1 capsule by mouth once a week    aspirin 325 MG tablet [Pharmacy Med Name: aspirin 325 mg tablet] 90 tablet 0     Sig: Take 1 tablet by mouth daily       Last Filled:  2/10/25    Patient Phone Number: [phone] (home)     Last appt: 2/13/2025   Next appt: Visit date not found    Last Labs DM: No results found for: \"LABA1C\"  Last Lipid:   Lab Results   Component Value Date/Time    CHOL 145 11/28/2023 09:58 AM    TRIG 67 11/28/2023 09:58 AM    HDL 72 11/28/2023 09:58 AM     Last PSA: No results found for: \"PSA\"  Last Thyroid:   Lab Results   Component Value Date/Time    TSH 3.66 11/28/2023 09:58 AM    T4FREE 1.6 04/11/2022 12:01 PM

## 2025-08-06 DIAGNOSIS — Z86.73 HISTORY OF STROKE: ICD-10-CM

## 2025-08-06 RX ORDER — ASPIRIN 325 MG
325 TABLET ORAL DAILY
Qty: 90 TABLET | Refills: 0 | Status: SHIPPED | OUTPATIENT
Start: 2025-08-06

## 2025-08-07 DIAGNOSIS — E55.9 VITAMIN D DEFICIENCY: ICD-10-CM

## 2025-08-07 RX ORDER — ERGOCALCIFEROL 1.25 MG/1
50000 CAPSULE, LIQUID FILLED ORAL WEEKLY
Qty: 12 CAPSULE | Refills: 0 | Status: SHIPPED | OUTPATIENT
Start: 2025-08-07